# Patient Record
Sex: FEMALE | Race: WHITE | NOT HISPANIC OR LATINO | Employment: PART TIME | URBAN - METROPOLITAN AREA
[De-identification: names, ages, dates, MRNs, and addresses within clinical notes are randomized per-mention and may not be internally consistent; named-entity substitution may affect disease eponyms.]

---

## 2018-12-02 ENCOUNTER — HOSPITAL ENCOUNTER (EMERGENCY)
Facility: HOSPITAL | Age: 48
Discharge: HOME | End: 2018-12-02
Payer: MEDICAID

## 2018-12-02 VITALS
TEMPERATURE: 98 F | SYSTOLIC BLOOD PRESSURE: 127 MMHG | DIASTOLIC BLOOD PRESSURE: 73 MMHG | HEART RATE: 77 BPM | RESPIRATION RATE: 20 BRPM | WEIGHT: 145 LBS | OXYGEN SATURATION: 97 % | HEIGHT: 64 IN | BODY MASS INDEX: 24.75 KG/M2

## 2018-12-02 DIAGNOSIS — R13.10 SWALLOWING DIFFICULTY: ICD-10-CM

## 2018-12-02 DIAGNOSIS — R09.A2 GLOBUS SENSATION: Primary | ICD-10-CM

## 2018-12-02 LAB
ALBUMIN SERPL BCP-MCNC: 4.1 G/DL
ALP SERPL-CCNC: 87 U/L
ALT SERPL W/O P-5'-P-CCNC: 18 U/L
ANION GAP SERPL CALC-SCNC: 9 MMOL/L
AST SERPL-CCNC: 18 U/L
B-HCG UR QL: NEGATIVE
BASOPHILS # BLD AUTO: ABNORMAL K/UL
BASOPHILS NFR BLD: 0 %
BILIRUB SERPL-MCNC: 0.2 MG/DL
BUN SERPL-MCNC: 11 MG/DL
CALCIUM SERPL-MCNC: 9.6 MG/DL
CHLORIDE SERPL-SCNC: 107 MMOL/L
CO2 SERPL-SCNC: 25 MMOL/L
CREAT SERPL-MCNC: 0.8 MG/DL
CTP QC/QA: YES
DIFFERENTIAL METHOD: ABNORMAL
EOSINOPHIL # BLD AUTO: ABNORMAL K/UL
EOSINOPHIL NFR BLD: 1 %
ERYTHROCYTE [DISTWIDTH] IN BLOOD BY AUTOMATED COUNT: 14.8 %
EST. GFR  (AFRICAN AMERICAN): >60 ML/MIN/1.73 M^2
EST. GFR  (NON AFRICAN AMERICAN): >60 ML/MIN/1.73 M^2
GLUCOSE SERPL-MCNC: 124 MG/DL
HCT VFR BLD AUTO: 43.3 %
HGB BLD-MCNC: 13.9 G/DL
LYMPHOCYTES # BLD AUTO: ABNORMAL K/UL
LYMPHOCYTES NFR BLD: 31 %
MCH RBC QN AUTO: 27.9 PG
MCHC RBC AUTO-ENTMCNC: 32.1 G/DL
MCV RBC AUTO: 87 FL
MONOCYTES # BLD AUTO: ABNORMAL K/UL
MONOCYTES NFR BLD: 9 %
NEUTROPHILS NFR BLD: 57 %
NEUTS BAND NFR BLD MANUAL: 2 %
PLATELET # BLD AUTO: 249 K/UL
PLATELET BLD QL SMEAR: ABNORMAL
PMV BLD AUTO: 12.8 FL
POTASSIUM SERPL-SCNC: 4 MMOL/L
PROT SERPL-MCNC: 7.9 G/DL
RBC # BLD AUTO: 4.98 M/UL
SODIUM SERPL-SCNC: 141 MMOL/L
TSH SERPL DL<=0.005 MIU/L-ACNC: 3.73 UIU/ML
WBC # BLD AUTO: 10.56 K/UL

## 2018-12-02 PROCEDURE — 81025 URINE PREGNANCY TEST: CPT

## 2018-12-02 PROCEDURE — 84443 ASSAY THYROID STIM HORMONE: CPT

## 2018-12-02 PROCEDURE — 80053 COMPREHEN METABOLIC PANEL: CPT

## 2018-12-02 PROCEDURE — 99285 EMERGENCY DEPT VISIT HI MDM: CPT | Mod: 25

## 2018-12-02 PROCEDURE — 85007 BL SMEAR W/DIFF WBC COUNT: CPT

## 2018-12-02 PROCEDURE — 85027 COMPLETE CBC AUTOMATED: CPT

## 2018-12-02 PROCEDURE — 93005 ELECTROCARDIOGRAM TRACING: CPT

## 2018-12-02 PROCEDURE — 25000003 PHARM REV CODE 250

## 2018-12-02 RX ORDER — PANTOPRAZOLE SODIUM 20 MG/1
20 TABLET, DELAYED RELEASE ORAL DAILY
Qty: 10 TABLET | Refills: 0 | Status: SHIPPED | OUTPATIENT
Start: 2018-12-02 | End: 2018-12-12

## 2018-12-02 RX ORDER — PANTOPRAZOLE SODIUM 40 MG/1
40 TABLET, DELAYED RELEASE ORAL
Status: COMPLETED | OUTPATIENT
Start: 2018-12-02 | End: 2018-12-02

## 2018-12-02 RX ORDER — HYDROXYZINE PAMOATE 25 MG/1
25 CAPSULE ORAL
Status: COMPLETED | OUTPATIENT
Start: 2018-12-02 | End: 2018-12-02

## 2018-12-02 RX ADMIN — HYDROXYZINE PAMOATE 25 MG: 25 CAPSULE ORAL at 03:12

## 2018-12-02 RX ADMIN — PANTOPRAZOLE SODIUM 40 MG: 40 TABLET, DELAYED RELEASE ORAL at 04:12

## 2018-12-02 NOTE — ED NOTES
Patient identifiers for Amarilys Aguilar checked and correct.  LOC: The patient is awake, alert and aware of environment, the patient is oriented x 3 and speaking appropriately.  APPEARANCE: Very anxious. Patient uncomfortable and in no acute distress, patient is clean and well groomed, patient's clothing are properly fastened.  SKIN: The skin is warm and dry, patient has normal skin turgor and moist mucus membranes, skin intact, no breakdown or brusing noted.  MUSKULOSKELETAL: Patient moving all extremities well, no obvious swelling or deformities noted.  RESPIRATORY: Airway is open and patent, respirations are spontaneous, patient has a normal effort and rate.

## 2018-12-02 NOTE — ED PROVIDER NOTES
Encounter Date: 12/2/2018    SCRIBE #1 NOTE: I, Davina Pretty, am scribing for, and in the presence of,  Dr. Acosta. I have scribed the entire note.       History     Chief Complaint   Patient presents with    Dysphagia     1 hr onset dysphagia. Denies pain - states that she feels like she can't swallow. Presents in no distress.     A 48 year-old female, PMHx Hashimoto's Graves disease and recent pre-diabetes,  presents to the ED complaining of intermittent difficulty swallowing that started an hour ago while she was laying down in bed.  reports that shortly after onset she began to feel anxious and panicked. Patient states that she feels like their is something inhibiting ability to swallow.  She denies any difficulty breathing, sore throat, voice change, throat itch, or any other concerning symptoms. Patient reports abnormal thyroid study with outpatient workup for Hashimoto's Graves disease. No other palliative or provocative factors except as noted.       The history is provided by the patient.     Review of patient's allergies indicates:  No Known Allergies  Past Medical History:   Diagnosis Date    GERD (gastroesophageal reflux disease)     Thyroid disease      History reviewed. No pertinent surgical history.  History reviewed. No pertinent family history.  Social History     Tobacco Use    Smoking status: Never Smoker   Substance Use Topics    Alcohol use: Yes     Comment: occ    Drug use: No     Review of Systems   All other systems reviewed and are negative.      Physical Exam     Initial Vitals [12/02/18 0208]   BP Pulse Resp Temp SpO2   (!) 183/99 (!) 113 16 97.6 °F (36.4 °C) 96 %      MAP       --         Physical Exam    Nursing note and vitals reviewed.  Constitutional: She appears well-developed.   HENT:   Head: Normocephalic and atraumatic.   No cobblestoning   No oropharyngeal swelling    Eyes: EOM are normal.   Neck: Neck supple.   Mild thyromegaly   No lymphadenopathy    Cardiovascular: Normal rate and regular rhythm.   Pulmonary/Chest: Breath sounds normal. No respiratory distress.   Abdominal: Soft. There is no tenderness.   Musculoskeletal: Normal range of motion.   No acute deformity   Neurological: She is alert and oriented to person, place, and time.   Skin: Skin is warm and dry.   Psychiatric: She has a normal mood and affect.         ED Course   Procedures  Labs Reviewed   CBC W/ AUTO DIFFERENTIAL - Abnormal; Notable for the following components:       Result Value    RDW 14.8 (*)     All other components within normal limits   COMPREHENSIVE METABOLIC PANEL - Abnormal; Notable for the following components:    Glucose 124 (*)     All other components within normal limits   TSH   POCT URINE PREGNANCY     EKG Readings: (Independently Interpreted)   Interpretation per ED physician. Sinus rhythm without ectopy. No emergently concerning ST or T wave changes. No STEMI.  Rate 96. QTc is 452. Normal EKG     X-Rays:   Independently Interpreted Readings:   Other Readings:  Reviewed by myself, read by radiology.      Imaging Results          X-Ray Neck Soft Tissue (In process)               xr neck soft tissue negative per [facility] interpretation.     Medical Decision Making:   Clinical Tests:   Lab Tests: Ordered and Reviewed  Radiological Study: Ordered and Reviewed  Medical Tests: Reviewed and Ordered  ED Management:  4:59 AM   Patient re-evaluated, with reported improvement after medication. Physical exam benign after observation in the ED. X-ray negative, per radiology interpretation.     Patient is nontoxic appearing in the ED.  No persistent emergent issues detected.  Exam benign.  We will discharge home to follow up with GI. Patient will return to the ED as needed for any deterioration or any other concerns.  Verbal discharge instructions and return precautions given.                       Clinical Impression:     1. Globus sensation    2. Swallowing difficulty               I,  José Acosta, personally performed the services described in this documentation. All medical record entries made by the scribe were at my direction and in my presence.  I have reviewed the chart and agree that the record reflects my personal performance and is accurate and complete within the limitations of emergency medical charting. José Acosta M.D.           José Acosta MD  12/02/18 9757

## 2023-03-15 ENCOUNTER — HOSPITAL ENCOUNTER (EMERGENCY)
Facility: HOSPITAL | Age: 53
Discharge: HOME | End: 2023-03-16
Attending: STUDENT IN AN ORGANIZED HEALTH CARE EDUCATION/TRAINING PROGRAM
Payer: MEDICAID

## 2023-03-15 DIAGNOSIS — R53.83 FATIGUE, UNSPECIFIED TYPE: ICD-10-CM

## 2023-03-15 DIAGNOSIS — R51.9 FRONTAL HEADACHE: Primary | ICD-10-CM

## 2023-03-15 DIAGNOSIS — R30.0 DYSURIA: ICD-10-CM

## 2023-03-15 LAB
ALBUMIN SERPL BCP-MCNC: 4.4 G/DL (ref 3.5–5.2)
ALP SERPL-CCNC: 87 U/L (ref 55–135)
ALT SERPL W/O P-5'-P-CCNC: 20 U/L (ref 10–44)
ANION GAP SERPL CALC-SCNC: 13 MMOL/L (ref 8–16)
AST SERPL-CCNC: 17 U/L (ref 10–40)
BASOPHILS # BLD AUTO: 0.04 K/UL (ref 0–0.2)
BASOPHILS NFR BLD: 0.3 % (ref 0–1.9)
BILIRUB SERPL-MCNC: 0.4 MG/DL (ref 0.1–1)
BILIRUB UR QL STRIP: NEGATIVE
BUN SERPL-MCNC: 12 MG/DL (ref 6–20)
CALCIUM SERPL-MCNC: 10.3 MG/DL (ref 8.7–10.5)
CHLORIDE SERPL-SCNC: 104 MMOL/L (ref 95–110)
CLARITY UR: CLEAR
CO2 SERPL-SCNC: 23 MMOL/L (ref 23–29)
COLOR UR: COLORLESS
CREAT SERPL-MCNC: 0.7 MG/DL (ref 0.5–1.4)
DIFFERENTIAL METHOD: ABNORMAL
EOSINOPHIL # BLD AUTO: 0.1 K/UL (ref 0–0.5)
EOSINOPHIL NFR BLD: 0.7 % (ref 0–8)
ERYTHROCYTE [DISTWIDTH] IN BLOOD BY AUTOMATED COUNT: 13.2 % (ref 11.5–14.5)
EST. GFR  (NO RACE VARIABLE): >60 ML/MIN/1.73 M^2
GLUCOSE SERPL-MCNC: 116 MG/DL (ref 70–110)
GLUCOSE UR QL STRIP: NEGATIVE
HCT VFR BLD AUTO: 41.9 % (ref 37–48.5)
HGB BLD-MCNC: 13.8 G/DL (ref 12–16)
HGB UR QL STRIP: NEGATIVE
IMM GRANULOCYTES # BLD AUTO: 0.05 K/UL (ref 0–0.04)
IMM GRANULOCYTES NFR BLD AUTO: 0.4 % (ref 0–0.5)
KETONES UR QL STRIP: ABNORMAL
LEUKOCYTE ESTERASE UR QL STRIP: NEGATIVE
LYMPHOCYTES # BLD AUTO: 1.5 K/UL (ref 1–4.8)
LYMPHOCYTES NFR BLD: 12.2 % (ref 18–48)
MAGNESIUM SERPL-MCNC: 1.9 MG/DL (ref 1.6–2.6)
MCH RBC QN AUTO: 29.3 PG (ref 27–31)
MCHC RBC AUTO-ENTMCNC: 32.9 G/DL (ref 32–36)
MCV RBC AUTO: 89 FL (ref 82–98)
MONOCYTES # BLD AUTO: 0.5 K/UL (ref 0.3–1)
MONOCYTES NFR BLD: 4.5 % (ref 4–15)
NEUTROPHILS # BLD AUTO: 9.8 K/UL (ref 1.8–7.7)
NEUTROPHILS NFR BLD: 81.9 % (ref 38–73)
NITRITE UR QL STRIP: NEGATIVE
NRBC BLD-RTO: 0 /100 WBC
PH UR STRIP: 6 [PH] (ref 5–8)
PLATELET # BLD AUTO: 215 K/UL (ref 150–450)
PMV BLD AUTO: 12.6 FL (ref 9.2–12.9)
POTASSIUM SERPL-SCNC: 4 MMOL/L (ref 3.5–5.1)
PROT SERPL-MCNC: 7.8 G/DL (ref 6–8.4)
PROT UR QL STRIP: NEGATIVE
RBC # BLD AUTO: 4.71 M/UL (ref 4–5.4)
SODIUM SERPL-SCNC: 140 MMOL/L (ref 136–145)
SP GR UR STRIP: 1 (ref 1–1.03)
T4 FREE SERPL-MCNC: 1.29 NG/DL (ref 0.71–1.51)
TSH SERPL DL<=0.005 MIU/L-ACNC: <0.01 UIU/ML (ref 0.4–4)
URN SPEC COLLECT METH UR: ABNORMAL
UROBILINOGEN UR STRIP-ACNC: NEGATIVE EU/DL
WBC # BLD AUTO: 11.92 K/UL (ref 3.9–12.7)

## 2023-03-15 PROCEDURE — 83735 ASSAY OF MAGNESIUM: CPT | Performed by: STUDENT IN AN ORGANIZED HEALTH CARE EDUCATION/TRAINING PROGRAM

## 2023-03-15 PROCEDURE — 99285 EMERGENCY DEPT VISIT HI MDM: CPT | Mod: 25

## 2023-03-15 PROCEDURE — 84443 ASSAY THYROID STIM HORMONE: CPT | Performed by: STUDENT IN AN ORGANIZED HEALTH CARE EDUCATION/TRAINING PROGRAM

## 2023-03-15 PROCEDURE — 25000003 PHARM REV CODE 250: Performed by: STUDENT IN AN ORGANIZED HEALTH CARE EDUCATION/TRAINING PROGRAM

## 2023-03-15 PROCEDURE — 80053 COMPREHEN METABOLIC PANEL: CPT | Performed by: PHYSICIAN ASSISTANT

## 2023-03-15 PROCEDURE — 25000003 PHARM REV CODE 250: Performed by: PHYSICIAN ASSISTANT

## 2023-03-15 PROCEDURE — 85025 COMPLETE CBC W/AUTO DIFF WBC: CPT | Performed by: PHYSICIAN ASSISTANT

## 2023-03-15 PROCEDURE — 84439 ASSAY OF FREE THYROXINE: CPT | Performed by: STUDENT IN AN ORGANIZED HEALTH CARE EDUCATION/TRAINING PROGRAM

## 2023-03-15 PROCEDURE — 81003 URINALYSIS AUTO W/O SCOPE: CPT | Performed by: STUDENT IN AN ORGANIZED HEALTH CARE EDUCATION/TRAINING PROGRAM

## 2023-03-15 PROCEDURE — 96361 HYDRATE IV INFUSION ADD-ON: CPT

## 2023-03-15 PROCEDURE — 96374 THER/PROPH/DIAG INJ IV PUSH: CPT

## 2023-03-15 PROCEDURE — 63600175 PHARM REV CODE 636 W HCPCS: Performed by: PHYSICIAN ASSISTANT

## 2023-03-15 RX ORDER — DIPHENHYDRAMINE HCL 25 MG
25 CAPSULE ORAL
Status: COMPLETED | OUTPATIENT
Start: 2023-03-15 | End: 2023-03-15

## 2023-03-15 RX ORDER — ACETAMINOPHEN 500 MG
1000 TABLET ORAL
Status: COMPLETED | OUTPATIENT
Start: 2023-03-15 | End: 2023-03-15

## 2023-03-15 RX ORDER — DIPHENHYDRAMINE HYDROCHLORIDE 50 MG/ML
25 INJECTION INTRAMUSCULAR; INTRAVENOUS
Status: DISCONTINUED | OUTPATIENT
Start: 2023-03-15 | End: 2023-03-15

## 2023-03-15 RX ORDER — PROCHLORPERAZINE EDISYLATE 5 MG/ML
10 INJECTION INTRAMUSCULAR; INTRAVENOUS
Status: COMPLETED | OUTPATIENT
Start: 2023-03-15 | End: 2023-03-15

## 2023-03-15 RX ADMIN — ACETAMINOPHEN 1000 MG: 500 TABLET ORAL at 09:03

## 2023-03-15 RX ADMIN — DIPHENHYDRAMINE HYDROCHLORIDE 25 MG: 25 CAPSULE ORAL at 09:03

## 2023-03-15 RX ADMIN — PROCHLORPERAZINE EDISYLATE 10 MG: 5 INJECTION INTRAMUSCULAR; INTRAVENOUS at 09:03

## 2023-03-15 RX ADMIN — SODIUM CHLORIDE 1000 ML: 0.9 INJECTION, SOLUTION INTRAVENOUS at 09:03

## 2023-03-16 VITALS
OXYGEN SATURATION: 98 % | RESPIRATION RATE: 18 BRPM | TEMPERATURE: 98 F | SYSTOLIC BLOOD PRESSURE: 125 MMHG | HEART RATE: 71 BPM | DIASTOLIC BLOOD PRESSURE: 58 MMHG | BODY MASS INDEX: 23.18 KG/M2 | WEIGHT: 135.06 LBS

## 2023-03-16 RX ORDER — BUTALBITAL, ACETAMINOPHEN AND CAFFEINE 50; 325; 40 MG/1; MG/1; MG/1
1 TABLET ORAL EVERY 4 HOURS PRN
Qty: 30 TABLET | Refills: 0 | Status: SHIPPED | OUTPATIENT
Start: 2023-03-16 | End: 2023-04-15

## 2023-03-16 NOTE — ED PROVIDER NOTES
Encounter Date: 3/15/2023       History     Chief Complaint   Patient presents with    Headache     Patient reports migraines that started 1 year ago. States she might gets one every 3 months. Reports N/V x 4 episodes with frontal lobe headache that started 2 hours ago. +photophobia. Has tried Excedrin with no relief. Patient crying from discomfort.  Patient reports hx of Hashtimoto's diease.      52-year-old female history of reflux disease, Hashimoto's thyroiditis (on levothyroxine) who presents with headache.  Patient reports current episode started approximately 4 hours ago and described as dull frontal and rated 10/10.  Patient reports having 4 episodes of emesis described as nonbloody nonbilious.  Endorsed when pain started having photophobia and phonophobia.  Reports taking Excedrin with minimal relief furthermore reports thinking she may have vomited the Excedrin she took.  She reports suffering from headaches approximately a year ago but have been intermittent and self-resolving.  She states for the past 3 months it has been progressively worsening.  She denies any changes in her thyroid medications but does endorse for the past week feeling generalized fatigue.  She reports having dysuria but denies hematuria.  Denies any fevers or chills.  Denies any abdominal pain.  Denies any chest pain or shortness of breath.  Denies any focal weakness.    The history is provided by the patient and the spouse.   Review of patient's allergies indicates:  No Known Allergies  Past Medical History:   Diagnosis Date    GERD (gastroesophageal reflux disease)     Thyroid disease      No past surgical history on file.  No family history on file.  Social History     Tobacco Use    Smoking status: Never   Substance Use Topics    Alcohol use: Yes     Comment: occ    Drug use: No     Review of Systems   Constitutional:  Negative for chills and fever.   HENT:  Negative for congestion and rhinorrhea.    Eyes:  Negative for pain.    Respiratory:  Negative for cough and shortness of breath.    Cardiovascular:  Negative for chest pain and leg swelling.   Gastrointestinal:  Positive for nausea and vomiting. Negative for abdominal pain.   Endocrine: Negative for polyuria.   Genitourinary:  Positive for dysuria. Negative for hematuria.   Musculoskeletal:  Negative for gait problem and neck pain.   Skin:  Negative for rash.   Allergic/Immunologic: Negative for immunocompromised state.   Neurological:  Positive for headaches. Negative for weakness.     Physical Exam     Initial Vitals [03/15/23 1949]   BP Pulse Resp Temp SpO2   (!) 143/64 100 18 97.6 °F (36.4 °C) 99 %      MAP       --         Physical Exam    Nursing note and vitals reviewed.  Constitutional: She appears well-developed and well-nourished. She is not diaphoretic. No distress.   HENT:   Head: Normocephalic and atraumatic.   Eyes: Conjunctivae and EOM are normal. Pupils are equal, round, and reactive to light.   Neck: No Brudzinski's sign and no Kernig's sign noted.   Normal range of motion.  Cardiovascular:  Regular rhythm.           Pulmonary/Chest: Breath sounds normal. No respiratory distress.   Abdominal: Abdomen is soft. Bowel sounds are normal. She exhibits no distension. There is no abdominal tenderness. There is no rebound and no guarding.   Musculoskeletal:         General: No tenderness. Normal range of motion.      Cervical back: Normal range of motion.     Lymphadenopathy:     She has no cervical adenopathy.   Neurological: She is alert and oriented to person, place, and time.   Moves all extremities and carries on conversation. CN- II: PERRL; III/IV/VI: EOMI w/out evidence of nystagmus; V: no deficits appreciated to light touch bilateral face; VII: no facial weakness, no facial asymmetry. Eyebrow raise symmetric. Smile symmetric; IX/X: palate midline, and raises symmetrically; XI: shoulder shrug 5/5 bilaterally; XII: tongue is midline w/out asymmetry. Strength 5/5 to  bilateral upper and lower extremities, sensation intact to light touch,       Skin: Skin is warm. Capillary refill takes less than 2 seconds.   Psychiatric: She has a normal mood and affect. Her behavior is normal.       ED Course   Procedures  Labs Reviewed   CBC W/ AUTO DIFFERENTIAL - Abnormal; Notable for the following components:       Result Value    Gran # (ANC) 9.8 (*)     Immature Grans (Abs) 0.05 (*)     Gran % 81.9 (*)     Lymph % 12.2 (*)     All other components within normal limits   COMPREHENSIVE METABOLIC PANEL - Abnormal; Notable for the following components:    Glucose 116 (*)     All other components within normal limits   TSH - Abnormal; Notable for the following components:    TSH <0.010 (*)     All other components within normal limits   URINALYSIS, REFLEX TO URINE CULTURE - Abnormal; Notable for the following components:    Color, UA Colorless (*)     Ketones, UA 1+ (*)     All other components within normal limits    Narrative:     Specimen Source->Urine   MAGNESIUM   T4, FREE          Imaging Results              CT Head Without Contrast (Final result)  Result time 03/15/23 22:59:25      Final result by Christopher Diaz MD (03/15/23 22:59:25)                   Impression:      No acute abnormality.      Electronically signed by: Christopher iDaz  Date:    03/15/2023  Time:    22:59               Narrative:    EXAMINATION:  CT HEAD WITHOUT CONTRAST    CLINICAL HISTORY:  Headache, new or worsening (Age >= 50y);    TECHNIQUE:  Low dose axial CT images obtained throughout the head without intravenous contrast. Sagittal and coronal reconstructions were performed.    COMPARISON:  None.    FINDINGS:  Intracranial compartment:    Ventricles and sulci are normal in size for age without evidence of hydrocephalus. No extra-axial blood or fluid collections.    The brain parenchyma appears normal. No parenchymal mass, hemorrhage, edema or major vascular distribution infarct.    Skull/extracranial  contents (limited evaluation): No fracture. Small left maxillary sinus polyp or mucous retention cyst.  Mastoid air cells and paranasal sinuses are otherwise essentially clear.                                       Medications   prochlorperazine injection Soln 10 mg (10 mg Intravenous Given 3/15/23 2135)   sodium chloride 0.9% bolus 1,000 mL 1,000 mL (0 mLs Intravenous Stopped 3/15/23 2354)   diphenhydrAMINE capsule 25 mg (25 mg Oral Given 3/15/23 2139)   acetaminophen tablet 1,000 mg (1,000 mg Oral Given 3/15/23 2139)     Medical Decision Making:   History:   Old Medical Records: I decided to obtain old medical records.  Initial Assessment:   52-year-old female history of reflux disease, Hashimoto's thyroiditis (on levothyroxine) who presents with headache.  Patient in no acute distress, no focal neurological deficits on exam.  Vitals notable for mild tachycardia and elevated blood pressure otherwise unremarkable.  Patient without any meningeal signs on exam.  Suspect patient's headache secondary to migraines given history.  Given patient's report of generalized fatigue for the past week will obtain thyroid studies.  Given reported dysuria will obtain UA to assess for urinary tract infection, no suprapubic tenderness on exam.  Currently no systemic signs of infection based on physical exam and vitals. Differential diagnosis includes migraine versus tension type headache. Presentation not consistent with acute intracranial bleed to include SAH (lack of risk factors, headache history). Presentation not consistent with acute CNS infection to include meningitis or brain abscess, Temporal arteritis unlikely, as is acute angle closure glaucoma given history and physical findings.  Give given no prior head imaging and patient is reporting of 10/10 headache (breast headache of her life) will obtain CT head.    Plan: pain medication, CT brain, serial reassessment      Clinical Tests:   Lab Tests: Ordered and  Reviewed  Radiological Study: Ordered and Reviewed           ED Course as of 03/16/23 0044   Wed Mar 15, 2023   2212 CBC without significant leukocytosis, anemia, or platelet abnormalities.   [AS]   2353 Patient reports improvement of symptoms after migraine cocktail.  UA without signs of infection, CT head negative. [AS]   2359 Free T4: 1.29 [AS]   Thu Mar 16, 2023   0011 Patient reports complete resolution of her headache.  Suspect headache secondary to migraine.  Free T4 level within normal limits.  Referral for Neurology place given worsening headaches these past 3 months.  Pt is currently stable for discharge. I see no indication of an emergent process beyond that addressed during our encounter but have duly counseled the patient/family regarding the need for prompt follow-up as well as the indications that should prompt immediate return to the emergency room should new or worrisome developments occur. The patient/family has been provided with verbal and printed direction regarding our final diagnosis(es) as well as instructions regarding use of OTC and/or Rx medications intended to manage the patient's aforementioned conditions. The patient/family verbalized an understanding. The patient/family is asked if there are any questions or concerns. We discuss the case, until all issues are addressed to the patient/family's satisfaction. Patient/family understands and is agreeable to the plan.   [AS]      ED Course User Index  [AS] George Quinteros MD          This dictation has been generated using M-Modal Fluency Direct dictation; some phonetic errors may occur.         Clinical Impression:   Final diagnoses:  [R51.9] Frontal headache (Primary)  [R53.83] Fatigue, unspecified type  [R30.0] Dysuria        ED Disposition Condition    Discharge Stable          ED Prescriptions       Medication Sig Dispense Start Date End Date Auth. Provider    butalbital-acetaminophen-caffeine -40 mg (FIORICET, ESGIC) -40 mg  per tablet Take 1 tablet by mouth every 4 (four) hours as needed for Pain. 30 tablet 3/16/2023 4/15/2023 George Quinteros MD          Follow-up Information       Follow up With Specialties Details Why Contact Info    [place] - Emergency Dept Emergency Medicine  If symptoms worsen [address] [facility] [address] [phone]    Primary care doctor  Call in 1 day for reassesment     Neurology   for reassesment              George Quinteros MD  03/16/23 0044

## 2023-03-16 NOTE — DISCHARGE INSTRUCTIONS
Thank you for coming to our Emergency Department today. It is important to remember that some problems are difficult to diagnose and may not be found during your first visit. Be sure to follow up with your primary care doctor and review any labs/imaging that was performed with them. If you do not have a primary care doctor, you may contact the one listed on your discharge paperwork or you may also call the [facility] Appointment Desk at [phone] to schedule an appointment with one.     All medications may potentially have side effects and it is impossible to predict which medications may give you side effects. If you feel that you are having a negative effect of any medication you should immediately stop taking them and seek medical attention.    Return to the ER with any questions/concerns, new/concerning symptoms, worsening or failure to improve. Do not drive or make any important decisions for 24 hours if you have received any pain medications, sedatives or mood altering drugs during your ER visit.

## 2023-03-16 NOTE — FIRST PROVIDER EVALUATION
Emergency Department TeleTriage Encounter Note      CHIEF COMPLAINT    Chief Complaint   Patient presents with    Headache     Patient reports migraines that started 1 year ago. States she might gets one every 3 months. Reports N/V x 4 episodes with frontal lobe headache that started 2 hours ago. +photophobia. Has tried Excedrin with no relief. Patient crying from discomfort.  Patient reports hx of Hashtimoto's diease.        VITAL SIGNS   Initial Vitals [03/15/23 1949]   BP Pulse Resp Temp SpO2   (!) 143/64 100 18 97.6 °F (36.4 °C) 99 %      MAP       --            ALLERGIES    Review of patient's allergies indicates:  No Known Allergies    PROVIDER TRIAGE NOTE  This is a teletriage evaluation of a 52 y.o. female presenting to the ED complaining of headache.  Reports she started with headaches about a year ago, but she has never seen a doctor for this.  Reports she is typically able to control pain with OTC meds.  Reports today the pain is different - started suddently.  Crying in pain and actively vomiting.  Will hold off on toradol until head ct results - will give other meds to help pain.     Initial orders will be placed and care will be transferred to an alternate provider when patient is roomed for a full evaluation. Any additional orders and the final disposition will be determined by that provider.         ORDERS  Labs Reviewed   CBC W/ AUTO DIFFERENTIAL   COMPREHENSIVE METABOLIC PANEL       ED Orders (720h ago, onward)      Start Ordered     Status Ordering Provider    03/15/23 2015 03/15/23 2001  prochlorperazine injection Soln 10 mg  ED 1 Time         Ordered JAQUELIN SULLIVAN    03/15/23 2015 03/15/23 2001  diphenhydrAMINE injection 25 mg  ED 1 Time         Ordered JAQUELIN SULLIVAN    03/15/23 2015 03/15/23 2001  sodium chloride 0.9% bolus 1,000 mL 1,000 mL  ED 1 Time         Ordered JAQUELIN SULLIVAN    03/15/23 2001 03/15/23 2001  Saline lock IV  Once         Ordered  JAQUELIN SULLIVAN    03/15/23 2001 03/15/23 2001  CBC auto differential  STAT         Ordered JAQUELIN SULLIVAN    03/15/23 2001 03/15/23 2001  Comprehensive metabolic panel  STAT         Ordered JAQUELIN SULLIVAN.    03/15/23 2001 03/15/23 2001  CT Head Without Contrast  1 time imaging         Ordered JAQUELIN SULLIVAN              Virtual Visit Note: The provider triage portion of this emergency department evaluation and documentation was performed via [employer], a HIPAA-compliant telemedicine application, in concert with a tele-presenter in the room. A face to face patient evaluation with one of my colleagues will occur once the patient is placed in an emergency department room.      DISCLAIMER: This note was prepared with [employer] voice recognition transcription software. Garbled syntax, mangled pronouns, and other bizarre constructions may be attributed to that software system.

## 2023-03-16 NOTE — ED NOTES
Pt c/o frontal headache and N/V. Pt endorses sensitivity to light. Pt has Hx migraines. +A/O x 4 w/ ABCs intact, NAD. VSS. No neuro deficits.     Review of patient's allergies indicates:  No Known Allergies     Patient has verified the spelling of their name and  on armband.   APPEARANCE: Patient is alert, calm, oriented x 4, and does not appear distressed.  SKIN: Skin is normal for race, warm, and dry. Normal skin turgor and mucous membranes moist.  CARDIAC: Normal rate and rhythm, no murmur heard.   RESPIRATORY:Normal rate and effort. Breath sounds clear bilaterally throughout chest. Respirations are equal and unlabored.    GASTRO: Bowel sounds normal, abdomen is soft, no tenderness, and no abdominal distention.  MUSCLE: Full ROM. No bony tenderness or soft tissue tenderness. No obvious deformity.  PERIPHERAL VASCULAR: peripheral pulses present. Normal cap refill. No edema. Warm to touch.  NEURO: 5/5 strength major flexors/extensors bilaterally. Sensory intact to light touch bilaterally. Mishel coma scale: eyes open spontaneously-4, oriented & converses-5, obeys commands-6. No neurological abnormalities.   MENTAL STATUS: awake, alert and aware of environment.  : Voids without complication    ED orders in progress. Pt SR up x 2. Bed in lowest position with wheels locked. Call bell within reach of pt.

## 2023-05-01 ENCOUNTER — TELEPHONE (OUTPATIENT)
Dept: EMERGENCY MEDICINE | Facility: HOSPITAL | Age: 53
End: 2023-05-01
Payer: MEDICAID

## 2023-06-01 ENCOUNTER — HOSPITAL ENCOUNTER (EMERGENCY)
Facility: HOSPITAL | Age: 53
Discharge: HOME | End: 2023-06-01
Attending: EMERGENCY MEDICINE
Payer: MEDICAID

## 2023-06-01 VITALS
SYSTOLIC BLOOD PRESSURE: 119 MMHG | RESPIRATION RATE: 20 BRPM | HEART RATE: 92 BPM | TEMPERATURE: 99 F | OXYGEN SATURATION: 98 % | BODY MASS INDEX: 22.48 KG/M2 | WEIGHT: 130.94 LBS | DIASTOLIC BLOOD PRESSURE: 59 MMHG

## 2023-06-01 DIAGNOSIS — R11.2 NAUSEA, VOMITING AND DIARRHEA: Primary | ICD-10-CM

## 2023-06-01 DIAGNOSIS — R19.7 NAUSEA, VOMITING AND DIARRHEA: Primary | ICD-10-CM

## 2023-06-01 LAB
ALBUMIN SERPL BCP-MCNC: 4.4 G/DL (ref 3.5–5.2)
ALP SERPL-CCNC: 101 U/L (ref 55–135)
ALT SERPL W/O P-5'-P-CCNC: 22 U/L (ref 10–44)
ANION GAP SERPL CALC-SCNC: 11 MMOL/L (ref 8–16)
AST SERPL-CCNC: 17 U/L (ref 10–40)
BASOPHILS # BLD AUTO: 0.05 K/UL (ref 0–0.2)
BASOPHILS NFR BLD: 0.3 % (ref 0–1.9)
BILIRUB SERPL-MCNC: 0.4 MG/DL (ref 0.1–1)
BUN SERPL-MCNC: 12 MG/DL (ref 6–20)
CALCIUM SERPL-MCNC: 10 MG/DL (ref 8.7–10.5)
CHLORIDE SERPL-SCNC: 107 MMOL/L (ref 95–110)
CO2 SERPL-SCNC: 24 MMOL/L (ref 23–29)
CREAT SERPL-MCNC: 0.8 MG/DL (ref 0.5–1.4)
DIFFERENTIAL METHOD: ABNORMAL
EOSINOPHIL # BLD AUTO: 0.2 K/UL (ref 0–0.5)
EOSINOPHIL NFR BLD: 1 % (ref 0–8)
ERYTHROCYTE [DISTWIDTH] IN BLOOD BY AUTOMATED COUNT: 14 % (ref 11.5–14.5)
EST. GFR  (NO RACE VARIABLE): >60 ML/MIN/1.73 M^2
GLUCOSE SERPL-MCNC: 180 MG/DL (ref 70–110)
HCT VFR BLD AUTO: 44.8 % (ref 37–48.5)
HGB BLD-MCNC: 14.5 G/DL (ref 12–16)
IMM GRANULOCYTES # BLD AUTO: 0.06 K/UL (ref 0–0.04)
IMM GRANULOCYTES NFR BLD AUTO: 0.4 % (ref 0–0.5)
LIPASE SERPL-CCNC: 18 U/L (ref 4–60)
LYMPHOCYTES # BLD AUTO: 1.6 K/UL (ref 1–4.8)
LYMPHOCYTES NFR BLD: 10.6 % (ref 18–48)
MAGNESIUM SERPL-MCNC: 2 MG/DL (ref 1.6–2.6)
MCH RBC QN AUTO: 28.6 PG (ref 27–31)
MCHC RBC AUTO-ENTMCNC: 32.4 G/DL (ref 32–36)
MCV RBC AUTO: 88 FL (ref 82–98)
MONOCYTES # BLD AUTO: 0.8 K/UL (ref 0.3–1)
MONOCYTES NFR BLD: 5.3 % (ref 4–15)
NEUTROPHILS # BLD AUTO: 12.8 K/UL (ref 1.8–7.7)
NEUTROPHILS NFR BLD: 82.4 % (ref 38–73)
NRBC BLD-RTO: 0 /100 WBC
PLATELET # BLD AUTO: 242 K/UL (ref 150–450)
PMV BLD AUTO: 12.3 FL (ref 9.2–12.9)
POTASSIUM SERPL-SCNC: 4 MMOL/L (ref 3.5–5.1)
PROT SERPL-MCNC: 8.1 G/DL (ref 6–8.4)
RBC # BLD AUTO: 5.07 M/UL (ref 4–5.4)
SODIUM SERPL-SCNC: 142 MMOL/L (ref 136–145)
WBC # BLD AUTO: 15.49 K/UL (ref 3.9–12.7)

## 2023-06-01 PROCEDURE — 99284 EMERGENCY DEPT VISIT MOD MDM: CPT | Mod: 25

## 2023-06-01 PROCEDURE — 96361 HYDRATE IV INFUSION ADD-ON: CPT

## 2023-06-01 PROCEDURE — 25000003 PHARM REV CODE 250: Performed by: EMERGENCY MEDICINE

## 2023-06-01 PROCEDURE — 80053 COMPREHEN METABOLIC PANEL: CPT | Performed by: EMERGENCY MEDICINE

## 2023-06-01 PROCEDURE — 96372 THER/PROPH/DIAG INJ SC/IM: CPT | Performed by: EMERGENCY MEDICINE

## 2023-06-01 PROCEDURE — 96374 THER/PROPH/DIAG INJ IV PUSH: CPT

## 2023-06-01 PROCEDURE — 63600175 PHARM REV CODE 636 W HCPCS: Performed by: EMERGENCY MEDICINE

## 2023-06-01 PROCEDURE — 83690 ASSAY OF LIPASE: CPT | Performed by: EMERGENCY MEDICINE

## 2023-06-01 PROCEDURE — 96375 TX/PRO/DX INJ NEW DRUG ADDON: CPT

## 2023-06-01 PROCEDURE — 85025 COMPLETE CBC W/AUTO DIFF WBC: CPT | Performed by: EMERGENCY MEDICINE

## 2023-06-01 PROCEDURE — 83735 ASSAY OF MAGNESIUM: CPT | Performed by: EMERGENCY MEDICINE

## 2023-06-01 RX ORDER — MORPHINE SULFATE 4 MG/ML
3 INJECTION, SOLUTION INTRAMUSCULAR; INTRAVENOUS
Status: COMPLETED | OUTPATIENT
Start: 2023-06-01 | End: 2023-06-01

## 2023-06-01 RX ORDER — ONDANSETRON 2 MG/ML
8 INJECTION INTRAMUSCULAR; INTRAVENOUS
Status: COMPLETED | OUTPATIENT
Start: 2023-06-01 | End: 2023-06-01

## 2023-06-01 RX ORDER — BUTALBITAL, ACETAMINOPHEN AND CAFFEINE 50; 325; 40 MG/1; MG/1; MG/1
2 TABLET ORAL EVERY 6 HOURS PRN
Qty: 20 TABLET | Refills: 1 | Status: SHIPPED | OUTPATIENT
Start: 2023-06-01 | End: 2023-07-01

## 2023-06-01 RX ORDER — ONDANSETRON 4 MG/1
4 TABLET, ORALLY DISINTEGRATING ORAL EVERY 6 HOURS PRN
Qty: 15 TABLET | Refills: 0 | Status: SHIPPED | OUTPATIENT
Start: 2023-06-01

## 2023-06-01 RX ORDER — DICYCLOMINE HYDROCHLORIDE 10 MG/ML
20 INJECTION INTRAMUSCULAR
Status: COMPLETED | OUTPATIENT
Start: 2023-06-01 | End: 2023-06-01

## 2023-06-01 RX ORDER — DICYCLOMINE HYDROCHLORIDE 20 MG/1
20 TABLET ORAL 3 TIMES DAILY PRN
Qty: 15 TABLET | Refills: 0 | Status: SHIPPED | OUTPATIENT
Start: 2023-06-01

## 2023-06-01 RX ADMIN — DICYCLOMINE HYDROCHLORIDE 20 MG: 20 INJECTION, SOLUTION INTRAMUSCULAR at 06:06

## 2023-06-01 RX ADMIN — ONDANSETRON 8 MG: 2 INJECTION INTRAMUSCULAR; INTRAVENOUS at 06:06

## 2023-06-01 RX ADMIN — MORPHINE SULFATE 3 MG: 4 INJECTION INTRAVENOUS at 06:06

## 2023-06-01 RX ADMIN — SODIUM CHLORIDE 2000 ML: 0.9 INJECTION, SOLUTION INTRAVENOUS at 06:06

## 2023-06-01 NOTE — ED NOTES
Pt c/o diffuse ABD pain and N/V/D x 4 hours. Pt reports pain as cramping. No other complaints.    Review of patient's allergies indicates:  No Known Allergies     Patient has verified the spelling of their name and  on armband.   APPEARANCE: Patient is alert, calm, oriented x 4, and does not appear distressed.  SKIN: Skin is normal for race, warm, and dry. Normal skin turgor and mucous membranes moist.  CARDIAC: Normal rate and rhythm, no murmur heard.   RESPIRATORY:Normal rate and effort. Breath sounds clear bilaterally throughout chest. Respirations are equal and unlabored.    GASTRO: Bowel sounds normal, abdomen is soft, +diffuse tenderness/ cramping, and no abdominal distention.  MUSCLE: Full ROM. No bony tenderness or soft tissue tenderness. No obvious deformity.  PERIPHERAL VASCULAR: peripheral pulses present. Normal cap refill. No edema. Warm to touch.  NEURO: 5/5 strength major flexors/extensors bilaterally. Sensory intact to light touch bilaterally. Mishel coma scale: eyes open spontaneously-4, oriented & converses-5, obeys commands-6. No neurological abnormalities.   MENTAL STATUS: awake, alert and aware of environment.  : Voids without complication    ED orders in progress. Pt SR up x 2. Bed in lowest position with wheels locked. Call bell within reach of pt.

## 2023-06-01 NOTE — Clinical Note
"Amarilys Aguilar (Karen) was seen and treated in our emergency department on 6/1/2023.  She may return to work on 06/03/2023.       If you have any questions or concerns, please don't hesitate to call.       RN    "

## 2023-06-01 NOTE — ED PROVIDER NOTES
Encounter Date: 6/1/2023       History     Chief Complaint   Patient presents with    Abdominal Pain     Patient states she ate seafood at restaurant around 7:30 pm last night. Started with N/V/D around 2 am.  states he ate same food with no symptoms. Patient denies fever.      Patient is a 52-year-old female who complains of nausea, vomiting and diarrhea.  This began abruptly about 4 hours ago.  She also has diffuse abdominal pain.  No fever.  Patient states she ate at a seafood restaurant at about 730 last night.   was with her at the restaurant but has no symptoms.    Review of patient's allergies indicates:  No Known Allergies  Past Medical History:   Diagnosis Date    GERD (gastroesophageal reflux disease)     Thyroid disease      History reviewed. No pertinent surgical history.  History reviewed. No pertinent family history.  Social History     Tobacco Use    Smoking status: Never   Substance Use Topics    Alcohol use: Yes     Comment: occ    Drug use: No     Review of Systems   Constitutional:  Negative for fever.   Gastrointestinal:  Positive for abdominal pain, diarrhea, nausea and vomiting.   Neurological:  Positive for weakness.     Physical Exam     Initial Vitals   BP Pulse Resp Temp SpO2   06/01/23 0551 06/01/23 0551 06/01/23 0551 06/01/23 0619 06/01/23 0551   130/75 (!) 114 19 97.9 °F (36.6 °C) 96 %      MAP       --                Physical Exam    Nursing note and vitals reviewed.  Constitutional: She appears distressed.   HENT:   Dry mucous membranes.   Eyes: EOM are normal.   Cardiovascular:            Tachycardic.   Pulmonary/Chest: Breath sounds normal.   Abdominal: Abdomen is soft. Bowel sounds are normal.   Diffuse tenderness without guarding or rebound.     Neurological: She is alert.   Skin: Skin is dry.   Psychiatric: Thought content normal.       ED Course   Procedures  Labs Reviewed   CBC W/ AUTO DIFFERENTIAL - Abnormal; Notable for the following components:       Result  Value    WBC 15.49 (*)     Gran # (ANC) 12.8 (*)     Immature Grans (Abs) 0.06 (*)     Gran % 82.4 (*)     Lymph % 10.6 (*)     All other components within normal limits   COMPREHENSIVE METABOLIC PANEL - Abnormal; Notable for the following components:    Glucose 180 (*)     All other components within normal limits   LIPASE   MAGNESIUM          Imaging Results    None          Medications   sodium chloride 0.9% bolus 2,000 mL 2,000 mL (0 mLs Intravenous Stopped 6/1/23 0840)   ondansetron injection 8 mg (8 mg Intravenous Given 6/1/23 0614)   dicyclomine injection 20 mg (20 mg Intramuscular Given 6/1/23 0617)   morphine injection 3 mg (3 mg Intravenous Given 6/1/23 0619)     Medical Decision Making:   Initial Assessment:   52-year-old female with vomiting and diarrhea over the past few hours.  Differential Diagnosis:   Viral gastroenteritis, food poisoning, small-bowel obstruction, pancreatitis, dehydration, electrolyte abnormality..  Clinical Tests:   Lab Tests: Ordered and Reviewed       <> Summary of Lab: CBC shows a white blood cell count of 15.49.  CMP with a glucose of 180.   ED Management:  Patient was given IV fluids, Zofran, Bentyl and morphine here in the emergency department.  Her symptoms were completely relieved.  Re-examination of her abdomen yields no tenderness.  Most likely etiology is viral gastroenteritis.  I feel the patient may be safely discharged to home and I will write her prescriptions for Bentyl and Zofran.  She should follow up with the primary physician but may return to the emergency department for any worsening of symptoms or any other urgent concerns.                        Clinical Impression:   Final diagnoses:  [R11.2, R19.7] Nausea, vomiting and diarrhea (Primary)        ED Disposition Condition    Discharge Stable          ED Prescriptions       Medication Sig Dispense Start Date End Date Auth. Provider    dicyclomine (BENTYL) 20 mg tablet Take 1 tablet (20 mg total) by mouth 3  (three) times daily as needed (Abdominal pain). 15 tablet 6/1/2023 -- Abhinav Leach MD    ondansetron (ZOFRAN-ODT) 4 MG TbDL Take 1 tablet (4 mg total) by mouth every 6 (six) hours as needed (Nausea or vomiting). 15 tablet 6/1/2023 -- Abhinav Leach MD    butalbital-acetaminophen-caffeine -40 mg (FIORICET, ESGIC) -40 mg per tablet Take 2 tablets by mouth every 6 (six) hours as needed for Headaches. 20 tablet 6/1/2023 7/1/2023 Abhinav Leach MD          Follow-up Information       Follow up With Specialties Details Why Contact Info    Your primary physician   As needed     [facility] Emergency Dept Emergency Medicine  If symptoms worsen [address] [address] [phone]             Abhinav Leach MD  06/01/23 2004

## 2023-06-01 NOTE — ED NOTES
Care hand off from BRADLEY Villegas RN. Dr. Leach at bedside for reassessment. Pt. Is feeling better since initiation of treatments. Tolerating sips of h2o at this time. Nausea is resolved presently. Minimal abdominal discomfort.

## 2023-06-07 ENCOUNTER — PATIENT OUTREACH (OUTPATIENT)
Dept: EMERGENCY MEDICINE | Facility: HOSPITAL | Age: 53
End: 2023-06-07
Payer: MEDICAID

## 2023-06-07 NOTE — PROGRESS NOTES
Magaly Whitman  ED Navigator  Emergency Department    [place] [facility] [place]  Role: Community Health Worker    Date: 06/07/2023  Patient Name: Amarilys Aguilar  MRN: [id number]  PCP: Lana Horowitz MD    Assessment:     Amarilys Aguilar is a 52 y.o. female who has presented to ED for abdominal pain. Patient has visited the ED 2 times in the past 3 months. Patient did not contact PCP.     ED Navigator Initial Assessment    ED Navigator Enrollment Documentation  Consent to Services  Does patient consent to completing the assessment?: Yes  Contact  Method of Initial Contact: Phone  Transportation  Does the patient have issues with Transportation?: No  Does the patient have transportation to and from healthcare appointments?: Yes  Insurance Coverage  Do you have coverage/adequate coverage?: Yes  Type/kind of coverage: [facility]  Is patient able to afford co-pays/deductibles?: Yes  Is patient able to afford HME or supplies?: Yes  Does patient have an established [facility] PCP?: Yes  Able to access?: Yes  Does the patient have a lack of adequate coverage?: No  Specialist Appointment  Did the patient come to the ED to see a specialist?: No  Does the patient have a pending specialist referral?: No  Does the patient have a specialist appointment made?: No  PCP Follow Up Appointment  Has the patient had an appointment with a primary care provider in the past year?: Yes  Approximate date: 6/6/23  Provider: Lana Horowitz MD  Does the patient have a follow up appontment with a PCP?: No  When was the last time you saw your PCP?: 6/6/23  Why does the patient not have a follow up scheduled?: Other (see comments) (Comment: 06/06 was her ED F/U appt from 06/01)  Medications  Is patient able to afford medication?: Yes  Is patient unable to get medication due to lack of transportation?: No  Psychological  Does the patient have psycho-social concerns?: No  Food  Does the patient have concerns about  food?: No  Communication/Education  Does the patient have limited English proficiency/English not primary language?: No  Does patient have low literacy and/or low health literacy?: Yes  Does patient have concerns with care?: No  Does patient have dissatisfaction with care?: No  Other Financial Concerns  Does the patient have immediate financial distress?: No  Does the patient have general financial concerns?: No  Other Social Barriers/Concerns  Does the patient have any additional barriers or concerns?: None  Primary Barrier  Barriers identified: Cognitive barrier (health literacy, language and communication, etc.)  Root Cause of ED Utilization: Patient Knowledge/Low Health Literacy  Plan to address Patient Knowledge/Low Health Literacy: Provided information for [facility] On [facility] 24/7 Nurse triage line [phone] or 1-866-[facility] ([phone])  Next steps: Provided Education  Was education/educational materials provided surrounding PCP services/creating a medical home?: Yes Was education verbal or written?: Written     Was education/educational materials provided surrounding low cost, healthy foods?: Yes Was education verbal or written?: Written     Was education/educational materials provided surrounding other items? If so, use comment to explain.: Yes Was education verbal or written?: Written   Plan: Provided information for [facility] 24/7 Nurse triage line, [phone] or 1-866-[facility] ([phone])  Expected Date of Follow Up 1: 7/4/23         Social History     Socioeconomic History    Marital status:    Tobacco Use    Smoking status: Never   Substance and Sexual Activity    Alcohol use: Yes     Comment: occ    Drug use: No     Social Determinants of Health     Financial Resource Strain: Low Risk     Difficulty of Paying Living Expenses: Not hard at all   Food Insecurity: No Food Insecurity    Worried About Running Out of Food in the Last Year: Never true    Ran Out of Food in the Last Year:  Never true   Transportation Needs: No Transportation Needs    Lack of Transportation (Medical): No    Lack of Transportation (Non-Medical): No   Stress: No Stress Concern Present    Feeling of Stress : Not at all   Social Connections: Unknown    Frequency of Communication with Friends and Family: Twice a week    Frequency of Social Gatherings with Friends and Family: Once a week    Marital Status:    Housing Stability: Low Risk     Unable to Pay for Housing in the Last Year: No    Number of Places Lived in the Last Year: 1    Unstable Housing in the Last Year: No       Plan:   Patient agreed with enrollment and F/U calls. Patient has already had PCP F/U after 6/1 ED visit. She needs no other assistance making appointments. Patient denies needing resources for transportation, food, housing, utilities, smoking cessation, and anxiety/stress/depression. Emailed resources to patient along with MCIP documents (Right Care Right Place form, [place] Virtual Visit Flyer, [facility] PCP scheduling assistance, [facility] on call RN #, and Heart Healthy Diet education). Will F/U with pt on 07/04/23

## 2023-07-14 ENCOUNTER — PATIENT OUTREACH (OUTPATIENT)
Dept: EMERGENCY MEDICINE | Facility: HOSPITAL | Age: 53
End: 2023-07-14
Payer: MEDICAID

## 2023-08-23 ENCOUNTER — PATIENT OUTREACH (OUTPATIENT)
Dept: EMERGENCY MEDICINE | Facility: HOSPITAL | Age: 53
End: 2023-08-23
Payer: MEDICAID

## 2023-11-02 ENCOUNTER — PATIENT OUTREACH (OUTPATIENT)
Dept: EMERGENCY MEDICINE | Facility: HOSPITAL | Age: 53
End: 2023-11-02
Payer: MEDICAID

## 2023-12-19 ENCOUNTER — PATIENT OUTREACH (OUTPATIENT)
Dept: EMERGENCY MEDICINE | Facility: HOSPITAL | Age: 53
End: 2023-12-19
Payer: MEDICAID

## 2023-12-19 NOTE — PROGRESS NOTES
Contacted patient today for F/U. Patient denies any new needs or needing any additional assistance at this time. Instructed patient to call with any future needs/concerns

## 2024-11-30 ENCOUNTER — HOSPITAL ENCOUNTER (OUTPATIENT)
Facility: HOSPITAL | Age: 54
Discharge: HOME | End: 2024-12-02
Attending: EMERGENCY MEDICINE | Admitting: INTERNAL MEDICINE
Payer: MEDICAID

## 2024-11-30 DIAGNOSIS — I24.9 ACS (ACUTE CORONARY SYNDROME): ICD-10-CM

## 2024-11-30 DIAGNOSIS — R07.9 CHEST PAIN: ICD-10-CM

## 2024-11-30 DIAGNOSIS — I21.4 NSTEMI (NON-ST ELEVATED MYOCARDIAL INFARCTION): Primary | ICD-10-CM

## 2024-11-30 DIAGNOSIS — R79.89 ELEVATED TROPONIN: ICD-10-CM

## 2024-11-30 DIAGNOSIS — R00.0 TACHYCARDIA: ICD-10-CM

## 2024-11-30 DIAGNOSIS — R10.9 ABDOMINAL PAIN, UNSPECIFIED ABDOMINAL LOCATION: ICD-10-CM

## 2024-11-30 DIAGNOSIS — F41.9 ANXIETY: ICD-10-CM

## 2024-11-30 PROCEDURE — 83690 ASSAY OF LIPASE: CPT | Performed by: EMERGENCY MEDICINE

## 2024-11-30 PROCEDURE — 84443 ASSAY THYROID STIM HORMONE: CPT | Performed by: EMERGENCY MEDICINE

## 2024-11-30 PROCEDURE — 84484 ASSAY OF TROPONIN QUANT: CPT | Performed by: EMERGENCY MEDICINE

## 2024-11-30 PROCEDURE — 80053 COMPREHEN METABOLIC PANEL: CPT | Performed by: EMERGENCY MEDICINE

## 2024-11-30 PROCEDURE — 85379 FIBRIN DEGRADATION QUANT: CPT | Performed by: EMERGENCY MEDICINE

## 2024-11-30 PROCEDURE — 83880 ASSAY OF NATRIURETIC PEPTIDE: CPT | Performed by: EMERGENCY MEDICINE

## 2024-11-30 PROCEDURE — 99285 EMERGENCY DEPT VISIT HI MDM: CPT

## 2024-11-30 PROCEDURE — 85025 COMPLETE CBC W/AUTO DIFF WBC: CPT | Performed by: EMERGENCY MEDICINE

## 2024-11-30 PROCEDURE — 84439 ASSAY OF FREE THYROXINE: CPT | Performed by: EMERGENCY MEDICINE

## 2024-11-30 RX ORDER — ASPIRIN 325 MG
325 TABLET ORAL
Status: COMPLETED | OUTPATIENT
Start: 2024-11-30 | End: 2024-12-01

## 2024-11-30 RX ORDER — NITROGLYCERIN 0.4 MG/1
0.4 TABLET SUBLINGUAL EVERY 5 MIN PRN
Status: DISCONTINUED | OUTPATIENT
Start: 2024-11-30 | End: 2024-12-02 | Stop reason: HOSPADM

## 2024-11-30 NOTE — Clinical Note
The catheter was inserted into the ostial  left coronary artery. An angiography was performed of the left coronary arteries. Multiple views were taken. The angiography was performed via hand injection with 40 mL of contrast.

## 2024-11-30 NOTE — Clinical Note
Attempted to call report to pre/post Cath Lab x2. Phone line rang busy each time. Will give a full bedside report.

## 2024-12-01 PROBLEM — I24.9 ACS (ACUTE CORONARY SYNDROME): Status: ACTIVE | Noted: 2024-12-01

## 2024-12-01 LAB
ABO + RH BLD: NORMAL
ALBUMIN SERPL BCP-MCNC: 4.5 G/DL (ref 3.5–5.2)
ALP SERPL-CCNC: 107 U/L (ref 40–150)
ALT SERPL W/O P-5'-P-CCNC: 21 U/L (ref 10–44)
ANION GAP SERPL CALC-SCNC: 12 MMOL/L (ref 8–16)
APTT PPP: 29.7 SEC (ref 21–32)
APTT PPP: 90.1 SEC (ref 21–32)
AST SERPL-CCNC: 20 U/L (ref 10–40)
BASOPHILS # BLD AUTO: 0.06 K/UL (ref 0–0.2)
BASOPHILS # BLD AUTO: 0.1 K/UL (ref 0–0.2)
BASOPHILS NFR BLD: 0.7 % (ref 0–1.9)
BASOPHILS NFR BLD: 0.9 % (ref 0–1.9)
BILIRUB SERPL-MCNC: 0.5 MG/DL (ref 0.1–1)
BLD GP AB SCN CELLS X3 SERPL QL: NORMAL
BNP SERPL-MCNC: <10 PG/ML (ref 0–99)
BUN SERPL-MCNC: 12 MG/DL (ref 6–20)
CALCIUM SERPL-MCNC: 9.8 MG/DL (ref 8.7–10.5)
CHLORIDE SERPL-SCNC: 105 MMOL/L (ref 95–110)
CHOLEST SERPL-MCNC: 180 MG/DL (ref 120–199)
CHOLEST/HDLC SERPL: 3.3 {RATIO} (ref 2–5)
CO2 SERPL-SCNC: 21 MMOL/L (ref 23–29)
CREAT SERPL-MCNC: 0.7 MG/DL (ref 0.5–1.4)
D DIMER PPP IA.FEU-MCNC: <0.19 MG/L FEU
DIFFERENTIAL METHOD BLD: ABNORMAL
DIFFERENTIAL METHOD BLD: NORMAL
EOSINOPHIL # BLD AUTO: 0.2 K/UL (ref 0–0.5)
EOSINOPHIL # BLD AUTO: 0.2 K/UL (ref 0–0.5)
EOSINOPHIL NFR BLD: 2.1 % (ref 0–8)
EOSINOPHIL NFR BLD: 2.2 % (ref 0–8)
ERYTHROCYTE [DISTWIDTH] IN BLOOD BY AUTOMATED COUNT: 13.4 % (ref 11.5–14.5)
ERYTHROCYTE [DISTWIDTH] IN BLOOD BY AUTOMATED COUNT: 13.4 % (ref 11.5–14.5)
EST. GFR  (NO RACE VARIABLE): >60 ML/MIN/1.73 M^2
GLUCOSE SERPL-MCNC: 108 MG/DL (ref 70–110)
HCT VFR BLD AUTO: 42.9 % (ref 37–48.5)
HCT VFR BLD AUTO: 43.2 % (ref 37–48.5)
HDLC SERPL-MCNC: 55 MG/DL (ref 40–75)
HDLC SERPL: 30.6 % (ref 20–50)
HGB BLD-MCNC: 14.2 G/DL (ref 12–16)
HGB BLD-MCNC: 14.4 G/DL (ref 12–16)
IMM GRANULOCYTES # BLD AUTO: 0.03 K/UL (ref 0–0.04)
IMM GRANULOCYTES # BLD AUTO: 0.04 K/UL (ref 0–0.04)
IMM GRANULOCYTES NFR BLD AUTO: 0.4 % (ref 0–0.5)
IMM GRANULOCYTES NFR BLD AUTO: 0.4 % (ref 0–0.5)
INR PPP: 1.1 (ref 0.8–1.2)
LDLC SERPL CALC-MCNC: 112.8 MG/DL (ref 63–159)
LIPASE SERPL-CCNC: 17 U/L (ref 4–60)
LYMPHOCYTES # BLD AUTO: 2.6 K/UL (ref 1–4.8)
LYMPHOCYTES # BLD AUTO: 3.8 K/UL (ref 1–4.8)
LYMPHOCYTES NFR BLD: 31.4 % (ref 18–48)
LYMPHOCYTES NFR BLD: 34.4 % (ref 18–48)
MAGNESIUM SERPL-MCNC: 2.1 MG/DL (ref 1.6–2.6)
MCH RBC QN AUTO: 28.7 PG (ref 27–31)
MCH RBC QN AUTO: 29.2 PG (ref 27–31)
MCHC RBC AUTO-ENTMCNC: 33.1 G/DL (ref 32–36)
MCHC RBC AUTO-ENTMCNC: 33.3 G/DL (ref 32–36)
MCV RBC AUTO: 87 FL (ref 82–98)
MCV RBC AUTO: 88 FL (ref 82–98)
MONOCYTES # BLD AUTO: 0.6 K/UL (ref 0.3–1)
MONOCYTES # BLD AUTO: 1.1 K/UL (ref 0.3–1)
MONOCYTES NFR BLD: 7.3 % (ref 4–15)
MONOCYTES NFR BLD: 9.6 % (ref 4–15)
NEUTROPHILS # BLD AUTO: 4.8 K/UL (ref 1.8–7.7)
NEUTROPHILS # BLD AUTO: 5.9 K/UL (ref 1.8–7.7)
NEUTROPHILS NFR BLD: 52.6 % (ref 38–73)
NEUTROPHILS NFR BLD: 58 % (ref 38–73)
NONHDLC SERPL-MCNC: 125 MG/DL
NRBC BLD-RTO: 0 /100 WBC
NRBC BLD-RTO: 0 /100 WBC
PLATELET # BLD AUTO: 232 K/UL (ref 150–450)
PLATELET # BLD AUTO: 261 K/UL (ref 150–450)
PMV BLD AUTO: 11.6 FL (ref 9.2–12.9)
PMV BLD AUTO: 11.7 FL (ref 9.2–12.9)
POTASSIUM SERPL-SCNC: 3.6 MMOL/L (ref 3.5–5.1)
PROT SERPL-MCNC: 7.8 G/DL (ref 6–8.4)
PROTHROMBIN TIME: 11.9 SEC (ref 9–12.5)
RBC # BLD AUTO: 4.93 M/UL (ref 4–5.4)
RBC # BLD AUTO: 4.95 M/UL (ref 4–5.4)
SODIUM SERPL-SCNC: 138 MMOL/L (ref 136–145)
SPECIMEN OUTDATE: NORMAL
T4 FREE SERPL-MCNC: 1.57 NG/DL (ref 0.71–1.51)
TRIGL SERPL-MCNC: 61 MG/DL (ref 30–150)
TROPONIN I SERPL DL<=0.01 NG/ML-MCNC: 0.04 NG/ML (ref 0–0.03)
TROPONIN I SERPL DL<=0.01 NG/ML-MCNC: 0.05 NG/ML (ref 0–0.03)
TROPONIN I SERPL DL<=0.01 NG/ML-MCNC: 0.06 NG/ML (ref 0–0.03)
TROPONIN I SERPL DL<=0.01 NG/ML-MCNC: 0.07 NG/ML (ref 0–0.03)
TROPONIN I SERPL DL<=0.01 NG/ML-MCNC: 0.07 NG/ML (ref 0–0.03)
TROPONIN I SERPL DL<=0.01 NG/ML-MCNC: <0.006 NG/ML (ref 0–0.03)
TSH SERPL DL<=0.005 MIU/L-ACNC: 0.04 UIU/ML (ref 0.4–4)
WBC # BLD AUTO: 11.14 K/UL (ref 3.9–12.7)
WBC # BLD AUTO: 8.34 K/UL (ref 3.9–12.7)

## 2024-12-01 PROCEDURE — 84484 ASSAY OF TROPONIN QUANT: CPT | Mod: 91

## 2024-12-01 PROCEDURE — 96372 THER/PROPH/DIAG INJ SC/IM: CPT

## 2024-12-01 PROCEDURE — 96366 THER/PROPH/DIAG IV INF ADDON: CPT

## 2024-12-01 PROCEDURE — 63600175 PHARM REV CODE 636 W HCPCS

## 2024-12-01 PROCEDURE — 94761 N-INVAS EAR/PLS OXIMETRY MLT: CPT

## 2024-12-01 PROCEDURE — 99900035 HC TECH TIME PER 15 MIN (STAT)

## 2024-12-01 PROCEDURE — 93010 ELECTROCARDIOGRAM REPORT: CPT | Mod: ,,, | Performed by: STUDENT IN AN ORGANIZED HEALTH CARE EDUCATION/TRAINING PROGRAM

## 2024-12-01 PROCEDURE — 85610 PROTHROMBIN TIME: CPT

## 2024-12-01 PROCEDURE — 83735 ASSAY OF MAGNESIUM: CPT

## 2024-12-01 PROCEDURE — 86850 RBC ANTIBODY SCREEN: CPT

## 2024-12-01 PROCEDURE — 25000003 PHARM REV CODE 250: Performed by: EMERGENCY MEDICINE

## 2024-12-01 PROCEDURE — 25000242 PHARM REV CODE 250 ALT 637 W/ HCPCS: Performed by: EMERGENCY MEDICINE

## 2024-12-01 PROCEDURE — 80061 LIPID PANEL: CPT

## 2024-12-01 PROCEDURE — 93005 ELECTROCARDIOGRAM TRACING: CPT

## 2024-12-01 PROCEDURE — 36415 COLL VENOUS BLD VENIPUNCTURE: CPT | Mod: XB | Performed by: INTERNAL MEDICINE

## 2024-12-01 PROCEDURE — 25000003 PHARM REV CODE 250

## 2024-12-01 PROCEDURE — 25000003 PHARM REV CODE 250: Performed by: INTERNAL MEDICINE

## 2024-12-01 PROCEDURE — 85730 THROMBOPLASTIN TIME PARTIAL: CPT

## 2024-12-01 PROCEDURE — 85025 COMPLETE CBC W/AUTO DIFF WBC: CPT

## 2024-12-01 PROCEDURE — 84484 ASSAY OF TROPONIN QUANT: CPT | Performed by: EMERGENCY MEDICINE

## 2024-12-01 PROCEDURE — 96365 THER/PROPH/DIAG IV INF INIT: CPT

## 2024-12-01 PROCEDURE — G0378 HOSPITAL OBSERVATION PER HR: HCPCS

## 2024-12-01 PROCEDURE — 99223 1ST HOSP IP/OBS HIGH 75: CPT | Mod: ,,, | Performed by: INTERNAL MEDICINE

## 2024-12-01 PROCEDURE — 96367 TX/PROPH/DG ADDL SEQ IV INF: CPT

## 2024-12-01 PROCEDURE — 36415 COLL VENOUS BLD VENIPUNCTURE: CPT | Mod: XB

## 2024-12-01 PROCEDURE — 85730 THROMBOPLASTIN TIME PARTIAL: CPT | Mod: 91 | Performed by: INTERNAL MEDICINE

## 2024-12-01 RX ORDER — HEPARIN SODIUM,PORCINE/D5W 25000/250
0-40 INTRAVENOUS SOLUTION INTRAVENOUS CONTINUOUS
Status: DISCONTINUED | OUTPATIENT
Start: 2024-12-01 | End: 2024-12-02 | Stop reason: HOSPADM

## 2024-12-01 RX ORDER — NAPROXEN SODIUM 220 MG/1
81 TABLET, FILM COATED ORAL DAILY
Status: DISCONTINUED | OUTPATIENT
Start: 2024-12-01 | End: 2024-12-02 | Stop reason: HOSPADM

## 2024-12-01 RX ORDER — LEVOTHYROXINE SODIUM 75 UG/1
150 TABLET ORAL
Status: DISCONTINUED | OUTPATIENT
Start: 2024-12-02 | End: 2024-12-02 | Stop reason: HOSPADM

## 2024-12-01 RX ORDER — ALUMINUM HYDROXIDE, MAGNESIUM HYDROXIDE, AND SIMETHICONE 1200; 120; 1200 MG/30ML; MG/30ML; MG/30ML
30 SUSPENSION ORAL ONCE
Status: COMPLETED | OUTPATIENT
Start: 2024-12-01 | End: 2024-12-01

## 2024-12-01 RX ORDER — METOPROLOL SUCCINATE 50 MG/1
50 TABLET, EXTENDED RELEASE ORAL DAILY
Status: DISCONTINUED | OUTPATIENT
Start: 2024-12-01 | End: 2024-12-02 | Stop reason: HOSPADM

## 2024-12-01 RX ORDER — LIDOCAINE HYDROCHLORIDE 20 MG/ML
15 SOLUTION OROPHARYNGEAL ONCE
Status: COMPLETED | OUTPATIENT
Start: 2024-12-01 | End: 2024-12-01

## 2024-12-01 RX ORDER — POTASSIUM CHLORIDE 7.45 MG/ML
10 INJECTION INTRAVENOUS
Status: DISCONTINUED | OUTPATIENT
Start: 2024-12-01 | End: 2024-12-01

## 2024-12-01 RX ORDER — HEPARIN SODIUM 5000 [USP'U]/ML
5000 INJECTION, SOLUTION INTRAVENOUS; SUBCUTANEOUS EVERY 8 HOURS
Status: DISCONTINUED | OUTPATIENT
Start: 2024-12-01 | End: 2024-12-01

## 2024-12-01 RX ORDER — ASPIRIN 325 MG
325 TABLET ORAL
Status: DISCONTINUED | OUTPATIENT
Start: 2024-12-01 | End: 2024-12-01

## 2024-12-01 RX ORDER — ATORVASTATIN CALCIUM 40 MG/1
40 TABLET, FILM COATED ORAL DAILY
Status: DISCONTINUED | OUTPATIENT
Start: 2024-12-01 | End: 2024-12-02 | Stop reason: HOSPADM

## 2024-12-01 RX ORDER — DIPHENHYDRAMINE HCL 25 MG
25 CAPSULE ORAL ONCE
Status: DISCONTINUED | OUTPATIENT
Start: 2024-12-01 | End: 2024-12-01

## 2024-12-01 RX ORDER — HYDROXYZINE PAMOATE 25 MG/1
25 CAPSULE ORAL ONCE AS NEEDED
Status: COMPLETED | OUTPATIENT
Start: 2024-12-01 | End: 2024-12-01

## 2024-12-01 RX ORDER — DIPHENHYDRAMINE HCL 25 MG
25 CAPSULE ORAL NIGHTLY PRN
Status: DISCONTINUED | OUTPATIENT
Start: 2024-12-01 | End: 2024-12-02 | Stop reason: HOSPADM

## 2024-12-01 RX ORDER — LIDOCAINE HYDROCHLORIDE 20 MG/ML
15 SOLUTION OROPHARYNGEAL ONCE
Status: DISCONTINUED | OUTPATIENT
Start: 2024-12-01 | End: 2024-12-01

## 2024-12-01 RX ORDER — CLOPIDOGREL BISULFATE 75 MG/1
600 TABLET ORAL ONCE
Status: COMPLETED | OUTPATIENT
Start: 2024-12-01 | End: 2024-12-01

## 2024-12-01 RX ORDER — PANTOPRAZOLE SODIUM 40 MG/1
40 TABLET, DELAYED RELEASE ORAL DAILY
Status: DISCONTINUED | OUTPATIENT
Start: 2024-12-01 | End: 2024-12-02 | Stop reason: HOSPADM

## 2024-12-01 RX ORDER — LORAZEPAM 1 MG/1
1 TABLET ORAL
Status: DISCONTINUED | OUTPATIENT
Start: 2024-12-01 | End: 2024-12-01

## 2024-12-01 RX ORDER — ASPIRIN 325 MG
325 TABLET ORAL ONCE
Status: DISCONTINUED | OUTPATIENT
Start: 2024-12-01 | End: 2024-12-01

## 2024-12-01 RX ORDER — SODIUM CHLORIDE 0.9 % (FLUSH) 0.9 %
10 SYRINGE (ML) INJECTION
Status: DISCONTINUED | OUTPATIENT
Start: 2024-12-01 | End: 2024-12-02 | Stop reason: HOSPADM

## 2024-12-01 RX ORDER — ALUMINUM HYDROXIDE, MAGNESIUM HYDROXIDE, AND SIMETHICONE 1200; 120; 1200 MG/30ML; MG/30ML; MG/30ML
30 SUSPENSION ORAL ONCE
Status: DISCONTINUED | OUTPATIENT
Start: 2024-12-01 | End: 2024-12-01

## 2024-12-01 RX ADMIN — ASPIRIN 325 MG ORAL TABLET 325 MG: 325 PILL ORAL at 12:12

## 2024-12-01 RX ADMIN — LIDOCAINE HYDROCHLORIDE 15 ML: 20 SOLUTION ORAL at 02:12

## 2024-12-01 RX ADMIN — PANTOPRAZOLE SODIUM 40 MG: 40 TABLET, DELAYED RELEASE ORAL at 05:12

## 2024-12-01 RX ADMIN — NITROGLYCERIN 0.4 MG: 0.4 TABLET SUBLINGUAL at 03:12

## 2024-12-01 RX ADMIN — LEVOTHYROXINE SODIUM 175 MCG: 100 TABLET ORAL at 05:12

## 2024-12-01 RX ADMIN — ALUMINUM HYDROXIDE, MAGNESIUM HYDROXIDE, AND SIMETHICONE 30 ML: 200; 200; 20 SUSPENSION ORAL at 02:12

## 2024-12-01 RX ADMIN — LIDOCAINE HYDROCHLORIDE 15 ML: 20 SOLUTION ORAL at 04:12

## 2024-12-01 RX ADMIN — HEPARIN SODIUM 12 UNITS/KG/HR: 10000 INJECTION, SOLUTION INTRAVENOUS at 11:12

## 2024-12-01 RX ADMIN — DIPHENHYDRAMINE HYDROCHLORIDE 25 MG: 25 CAPSULE ORAL at 09:12

## 2024-12-01 RX ADMIN — HEPARIN SODIUM 9 UNITS/KG/HR: 10000 INJECTION, SOLUTION INTRAVENOUS at 07:12

## 2024-12-01 RX ADMIN — POTASSIUM CHLORIDE 10 MEQ: 7.46 INJECTION, SOLUTION INTRAVENOUS at 09:12

## 2024-12-01 RX ADMIN — ALUMINUM HYDROXIDE, MAGNESIUM HYDROXIDE, AND SIMETHICONE 30 ML: 200; 200; 20 SUSPENSION ORAL at 04:12

## 2024-12-01 RX ADMIN — METOPROLOL SUCCINATE 50 MG: 50 TABLET, EXTENDED RELEASE ORAL at 11:12

## 2024-12-01 RX ADMIN — HYDROXYZINE PAMOATE 25 MG: 25 CAPSULE ORAL at 09:12

## 2024-12-01 RX ADMIN — CLOPIDOGREL BISULFATE 600 MG: 75 TABLET ORAL at 04:12

## 2024-12-01 RX ADMIN — ASPIRIN 81 MG CHEWABLE TABLET 81 MG: 81 TABLET CHEWABLE at 11:12

## 2024-12-01 RX ADMIN — HEPARIN SODIUM 5000 UNITS: 5000 INJECTION INTRAVENOUS; SUBCUTANEOUS at 05:12

## 2024-12-01 NOTE — CONSULTS
[facility] - Telemetry  Cardiology  Consult Note    Patient Name: Amarilys Aguilar  MRN: [id number]  Admission Date: 11/30/2024  Hospital Length of Stay: 0 days  Code Status: Full Code   Attending Provider: Rene Vela MD   Consulting Provider: Dakota Pak MD  Primary Care Physician: Lana Horowitz MD  Principal Problem:ACS (acute coronary syndrome)    Patient information was obtained from patient, past medical records, and ER records.     Inpatient consult to Interventional Cardiology  Consult performed by: Dakota Pak MD  Consult ordered by: Jayde Goldstein MD  Reason for consult: chest pain/NSTEMI        Subjective:     Chief Complaint:    Chest pain     HPI:      54-year-old female past medical history of reflux disease, Hashimoto thyroiditis/hypothyroidism, psoriasis, past history of H pylori infection, under a lot of emotional stress recently, admitted to the hospital with substernal chest pain and heaviness radiating to the left arm and jaw, admitted to the hospital with diagnosis of non ST elevation MI with troponin of 0.07, LDL of 112, EKG shows inferior ST/T-wave changes that are slightly worsened compared to previous EKGs.  Cardiology consulted for diagnosis of non ST elevation MI.        Past Medical History:   Diagnosis Date    GERD (gastroesophageal reflux disease)     Thyroid disease        History reviewed. No pertinent surgical history.    Review of patient's allergies indicates:   Allergen Reactions    Gluten protein        No current facility-administered medications on file prior to encounter.     Current Outpatient Medications on File Prior to Encounter   Medication Sig    levothyroxine (SYNTHROID) 200 MCG tablet Take 175 mcg by mouth once daily.     pantoprazole (PROTONIX) 20 MG tablet Take 1 tablet (20 mg total) by mouth once daily. for 10 days    [DISCONTINUED] dicyclomine (BENTYL) 20 mg tablet Take 1 tablet (20 mg total) by mouth 3 (three) times daily as needed (Abdominal  pain).    [DISCONTINUED] ondansetron (ZOFRAN-ODT) 4 MG TbDL Take 1 tablet (4 mg total) by mouth every 6 (six) hours as needed (Nausea or vomiting).     Family History    None       Tobacco Use    Smoking status: Never    Smokeless tobacco: Not on file   Substance and Sexual Activity    Alcohol use: Yes     Comment: occ    Drug use: No    Sexual activity: Not on file     Review of Systems   Constitutional: Negative for chills and fever.   HENT:  Negative for hearing loss and nosebleeds.    Eyes:  Negative for blurred vision.   Cardiovascular:  Positive for chest pain. Negative for leg swelling and palpitations.   Respiratory:  Negative for hemoptysis and shortness of breath.    Hematologic/Lymphatic: Negative for bleeding problem.   Skin:  Negative for itching.   Musculoskeletal:  Negative for falls.   Gastrointestinal:  Negative for abdominal pain and hematochezia.   Genitourinary:  Negative for hematuria.   Neurological:  Negative for dizziness and loss of balance.   Psychiatric/Behavioral:  Negative for altered mental status and depression.      Objective:     Vital Signs (Most Recent):  Temp: 98 °F (36.7 °C) (12/01/24 1145)  Pulse: 97 (12/01/24 1237)  Resp: 17 (12/01/24 1145)  BP: 123/62 (12/01/24 1145)  SpO2: 95 % (12/01/24 0906) Vital Signs (24h Range):  Temp:  [97.1 °F (36.2 °C)-98 °F (36.7 °C)] 98 °F (36.7 °C)  Pulse:  [] 97  Resp:  [16-22] 17  SpO2:  [95 %-100 %] 95 %  BP: (118-163)/(61-74) 123/62     Weight: 61.2 kg (135 lb)  Body mass index is 23.17 kg/m².    SpO2: 95 %         Intake/Output Summary (Last 24 hours) at 12/1/2024 1357  Last data filed at 12/1/2024 1341  Gross per 24 hour   Intake 360 ml   Output --   Net 360 ml       Lines/Drains/Airways       Peripheral Intravenous Line  Duration                  Peripheral IV - Single Lumen 11/30/24 2353 18 G 1 1/4 in Anterior;Proximal;Right Forearm <1 day                    Physical Exam  Constitutional:       Appearance: She is well-developed.    HENT:      Head: Normocephalic and atraumatic.   Eyes:      Conjunctiva/sclera: Conjunctivae normal.   Neck:      Vascular: No carotid bruit or JVD.   Cardiovascular:      Rate and Rhythm: Normal rate and regular rhythm.      Pulses:           Carotid pulses are 2+ on the right side and 2+ on the left side.       Radial pulses are 2+ on the right side and 2+ on the left side.      Heart sounds: Normal heart sounds. No murmur heard.     No friction rub. No gallop.   Pulmonary:      Effort: Pulmonary effort is normal. No respiratory distress.      Breath sounds: Normal breath sounds. No stridor. No wheezing.   Musculoskeletal:      Cervical back: Neck supple.   Skin:     General: Skin is warm and dry.   Neurological:      Mental Status: She is alert and oriented to person, place, and time.   Psychiatric:         Behavior: Behavior normal.         Significant Labs: All pertinent lab results from the last 24 hours have been reviewed. and   Recent Lab Results  (Last 5 results in the past 24 hours)        12/01/24  1117   12/01/24  0946   12/01/24  0432   12/01/24  0431   12/01/24  0300        PTT 29.7  Comment: Refer to local heparin nomogram for intensity/dose specific   therapeutic   range.  LOT^050^APTT FSL^[id number]                 Baso # 0.06               Basophil % 0.7               Cholesterol Total     180  Comment: The National Cholesterol Education Program (NCEP) has set the  following guidelines (reference ranges) for Cholesterol:  Optimal.....................<200 mg/dL  Borderline High.............200-239 mg/dL  High........................> or = 240 mg/dL             Differential Method Automated               Eos # 0.2               Eos % 2.2               Gran # (ANC) 4.8               Gran % 58.0               Group & Rh       A POS         HDL     55  Comment: The National Cholesterol Education Program (NCEP) has set the  following guidelines (reference values) for HDL Cholesterol:  Low...............<40  mg/dL  Optimal...........>60 mg/dL             HDL/Cholesterol Ratio     30.6           Hematocrit 43.2               Hemoglobin 14.4               Immature Grans (Abs) 0.03  Comment: Mild elevation in immature granulocytes is non specific and   can be seen in a variety of conditions including stress response,   acute inflammation, trauma and pregnancy. Correlation with other   laboratory and clinical findings is essential.                 Immature Granulocytes 0.4               INDIRECT JENNIFER       NEG         INR 1.1  Comment: Coumadin Therapy:  2.0 - 3.0 for INR for all indicators except mechanical heart valves  and antiphospholipid syndromes which should use 2.5 - 3.5.  LOT^040^PT Inn^[id number]                 LDL Cholesterol     112.8  Comment: The National Cholesterol Education Program (NCEP) has set the  following guidelines (reference values) for LDL Cholesterol:  Optimal.......................<130 mg/dL  Borderline High...............130-159 mg/dL  High..........................160-189 mg/dL  Very High.....................>190 mg/dL             Lymph # 2.6               Lymph % 31.4               Magnesium      2.1           MCH 29.2               MCHC 33.3               MCV 88               Mono # 0.6               Mono % 7.3               MPV 11.7               Non-HDL Cholesterol     125  Comment: Risk category and Non-HDL cholesterol goals:  Coronary heart disease (CHD)or equivalent (10-year risk of CHD >20%):  Non-HDL cholesterol goal     <130 mg/dL  Two or more CHD risk factors and 10-year risk of CHD <= 20%:  Non-HDL cholesterol goal     <160 mg/dL  0 to 1 CHD risk factor:  Non-HDL cholesterol goal     <190 mg/dL             nRBC 0               Platelet Count 232               PT 11.9               RBC 4.93               RDW 13.4               Specimen Outdate       12/04/2024 23:59         Total Cholesterol/HDL Ratio     3.3           Triglycerides     61  Comment: The National Cholesterol Education  Program (NCEP) has set the  following guidelines (reference values) for triglycerides:  Normal......................<150 mg/dL  Borderline High.............150-199 mg/dL  High........................200-499 mg/dL             Troponin I   0.071  Comment: The reference interval for Troponin I represents the 99th percentile   cutoff   for our facility and is consistent with 3rd generation assay   performance.       0.059  Comment: The reference interval for Troponin I represents the 99th percentile   cutoff   for our facility and is consistent with 3rd generation assay   performance.     0.040  Comment: The reference interval for Troponin I represents the 99th percentile   cutoff   for our facility and is consistent with 3rd generation assay   performance.         WBC 8.34                                      Significant Imaging: Echocardiogram: Transthoracic echo (TTE) complete (Cupid Only): No results found for this or any previous visit.  Assessment and Plan:     Active Diagnoses:    Diagnosis Date Noted POA    PRINCIPAL PROBLEM:  ACS (acute coronary syndrome) [I24.9] 12/01/2024 Yes      Problems Resolved During this Admission:         -  echocardiogram tomorrow morning.  Patient has a diagnosis of non ST elevation MI.  Continue aspirin 81 mg daily.  I will load her with Plavix 600 mg daily today.  -  Continue high-intensity statin.    -  continue Toprol-XL 50 mg daily.  -   Continue on telemetry.  NPO after midnight.  Left heart catheterization tomorrow morning.    VTE Risk Mitigation (From admission, onward)           Ordered     heparin 25,000 units in dextrose 5% (100 units/ml) IV bolus from bag LOW INTENSITY nomogram - [facility]  As needed (PRN)        Question:  Heparin Infusion Adjustment (DO NOT MODIFY ANSWER)  Answer:  [email or web address]\epic\Images\Pharmacy\HeparinInfusions\heparin LOW INTENSITY nomogram for [facility] [id number].pdf    12/01/24 1101     heparin 25,000 units in dextrose 5% (100 units/ml) IV bolus from bag LOW  INTENSITY nomogram - [facility]  As needed (PRN)        Question:  Heparin Infusion Adjustment (DO NOT MODIFY ANSWER)  Answer:  [email or web address]\epic\Images\Pharmacy\HeparinInfusions\heparin LOW INTENSITY nomogram for [facility] [id number].pdf    12/01/24 1101     heparin 25,000 units in dextrose 5% 250 mL (100 units/mL) infusion LOW INTENSITY nomogram - [facility]  Continuous        Question:  Begin at (units/kg/hr)  Answer:  12    12/01/24 1101     IP VTE HIGH RISK PATIENT  Once         12/01/24 0349     Place sequential compression device  Until discontinued         12/01/24 0349                    Thank you for your consult. I will follow-up with patient. Please contact us if you have any additional questions.    Dakota Pak MD  Cardiology   [facility] - Telemetry

## 2024-12-01 NOTE — PLAN OF CARE
VIRTUAL NURSE:  Called into patient's room via telephone. The room patient is in do not have VN equipment available.  Introduced as VN for night shift that will be working with floor nurse and nursing assistant.  Educated patient on VN's role in patient care and  VIP model.  Plan of care reviewed with patient.  Education per flowsheet.   Informed patient that staff will round on them every 2 hours but to use call light for any other needs they may have; informed of fall risk and fall precautions.  Patient verbalized understanding.  Asked if call light is within reach; bed siderails up x2.  Opportunity given for questions and questions answered.  Admission assessment questions answered.  Patient requests and needs were placed in orders & relayed to bedside RN. Instructed to call for assistance.  Will cont to monitor and intervene as needed.    Labs, notes, orders, and careplan initiated.   Problem: Adult Inpatient Plan of Care  Goal: Plan of Care Review  Outcome: Progressing  Goal: Patient-Specific Goal (Individualized)  Outcome: Progressing      12/01/24 0555   Nurse Notification   Bedside Nurse Notified? Yes   Name of Bedside Nurse PARAG Zelaya   Nurse Notfication Method Secure Chat   Nurse Notified Of Other  (Admission profile completed)   Admission   Initial VN Admission Questions Complete   Communication Issues?   (This patient's room does not have VN equipment)   Shift   Virtual Nurse - Rounding Complete   Virtual Nurse - Patient Verbalized Approval Of VN Rounding   Type of Frequent Check   Type Patient Rounds;Telemetry Monitoring   Safety/Activity   Safety Promotion/Fall Prevention Fall Risk reviewed with patient/family   Pain/Comfort/Sleep   Preferred Pain Scale number (Numeric Rating Pain Scale)   Comfort/Acceptable Pain Level 0   Pain Rating (0-10): Rest 3   Pain Rating (0-10): Activity 3   Sleep/Rest/Relaxation awake   Additional Pain Site Documentation Pain Assessment: Number Scale (0-10) (LDA)   Cardiac    Cardiac/Telemetry Monitor On Yes   OTHER   Cardiac/Telemetry Audible Yes

## 2024-12-01 NOTE — H&P
[facility] Medicine H&P Note     Admitting Team: [facility] Hospitalist Team B  Attending Physician: Rene Vela MD  Resident: Triston  Intern: Ivory    Date of Admit: 11/30/2024    Chief Complaint     Chest pain x 5 hours    Subjective:      History of Present Illness:  Amarilys Aguilar is a 54 y.o. female with history of GERD, hypothyroidism 2/2 Hashimoto's, psoriasis, and remote hx of H. pylori who presented to [facility] on 11/30/2024 with a primary complaint of substernal chest pain that began around 2330 while showering.    The patient was in their usual state of health until approximately 2330 while showering before bed when she began experiencing chest tightness that transitioned into intense substernal chest pressure after approximately 1 minute. She also endorses experiencing nausea as well as left arm and left jaw pain for approximately 3-5 minutes that has since resolved. Her chest discomfort has been intermittent since arriving to the ED. She denies experiencing anything like this previously. She denies experiencing any diaphoresis, palpitations, SOB, headache, dizziness, abdominal pain, fever, chills, or vomiting.    Of note, she states that her now ex-fiance ended their relationship this past Wednesday, one day before Thanksgiving and two days before her birthday. She believes that the stress from this could be contributing to her presentation.    She also states that she has been experiencing decreased appetite over the past two weeks due to chronic reflux symptoms and has currently been eating spinach, sauerkraut, and some fruit. She has trialed TUMS but states that it does not relieve her symptoms.      Past Medical History:  Gastroesophageal Reflux Disease  Hypothyroidism Secondary to Hashimoto's Thyroiditis  Prediabetes  Psoriasis  Vitamin D Deficiency    Past Surgical History:  None per patient    Allergies:  NKDA per patient    Home Medications:  Levothyroxine 175  "[facility] PO qam    Family History:  Mother: COPD  Father: heart disease, DM  Half sister: heart disease, MI (60's)  Half brother x 2: healthy  Maternal grandmother: lung cancer  Maternal grandfather: cancer  Maternal aunt: ovarian cancer  Paternal grandmother: heart disease    Social History:  Tobacco: never smoker  EtOH: seldom has glass of wine  Drug: none  Lives with: ex-fiance  Work:       Review of Systems:  Pertinent items are noted in HPI. All other systems are reviewed and are negative.      Health Maintaince :   Primary Care Physician: Lana Horowitz MD    Immunizations:     There is no immunization history on file for this patient.    Tdap: not UTD  Flu: not UTD  Pna: not yet indicated  Shingrix: not UTD  COVID-19: not UTD    Cancer Screening:  PAP: not UTD, patient estimates was last done ~ 10 years ago  MMG: last performed last year per patient  Colonoscopy: never performed       Objective:   Last 24 Hour Vital Signs:  BP  Min: 134/61  Max: 163/74  Temp  Av °F (36.7 °C)  Min: 98 °F (36.7 °C)  Max: 98 °F (36.7 °C)  Pulse  Av.3  Min: 90  Max: 113  Resp  Av.5  Min: 16  Max: 22  SpO2  Av.7 %  Min: 96 %  Max: 100 %  Height  Av' 4" (162.6 cm)  Min: 5' 4" (162.6 cm)  Max: 5' 4" (162.6 cm)  Weight  Av.2 kg (135 lb)  Min: 61.2 kg (135 lb)  Max: 61.2 kg (135 lb)  Body mass index is 23.17 kg/m².  No intake/output data recorded.    Physical Examination:  General: WNWD adult female reclined in bed in NAD  HEENT: NC/AT, EOMI, MMM  Neck: Supple, trachea midline, no JVD  CV: RRR, normal S1/S2, no m/c/g/r appreciated  Resp: CTAB, no wheezing or rhonchi appreciated, normal respiratory effort  Abd: Soft, NT/ND, normoactive bowel sounds  Ext: 2+ pulses, no edema  Skin: Warm, dry, intact, no rash or erythema appreciated on exposed skin  Neuro: AAOx3, no obvious focal deficits, spontaneously moving extremities  Psych: Pleasant mood, somewhat anxious/sad affect, cooperative with " exam    Laboratory:  Most Recent Data:  CBC:   Lab Results   Component Value Date    WBC 11.14 11/30/2024    HGB 14.2 11/30/2024    HCT 42.9 11/30/2024     11/30/2024    MCV 87 11/30/2024    RDW 13.4 11/30/2024     BMP:   Lab Results   Component Value Date     11/30/2024    K 3.6 11/30/2024     11/30/2024    CO2 21 (L) 11/30/2024    BUN 12 11/30/2024    CREATININE 0.7 11/30/2024     11/30/2024    CALCIUM 9.8 11/30/2024    MG 2.0 06/01/2023     LFTs:   Lab Results   Component Value Date    PROT 7.8 11/30/2024    ALBUMIN 4.5 11/30/2024    BILITOT 0.5 11/30/2024    AST 20 11/30/2024    ALKPHOS 107 11/30/2024    ALT 21 11/30/2024     DM:   Lab Results   Component Value Date    HGBA1C 5.8 (H) 12/18/2020    CREATININE 0.7 11/30/2024     Thyroid:   Lab Results   Component Value Date    TSH 0.035 (L) 11/30/2024    FREET4 1.57 (H) 11/30/2024     Cardiac:   Lab Results   Component Value Date    TROPONINI 0.040 (H) 12/01/2024    BNP <10 11/30/2024     Urinalysis:   Lab Results   Component Value Date    LABURIN No significant growth 10/17/2013    COLORU Colorless (A) 03/15/2023    SPECGRAV 1.005 03/15/2023    NITRITE Negative 03/15/2023    KETONESU 1+ (A) 03/15/2023    UROBILINOGEN Negative 03/15/2023    WBCUA 2 10/17/2013       Trended Lab Data:  Recent Labs   Lab 11/30/24 2352   WBC 11.14   HGB 14.2   HCT 42.9      MCV 87   RDW 13.4      K 3.6      CO2 21*   BUN 12   CREATININE 0.7      PROT 7.8   ALBUMIN 4.5   BILITOT 0.5   AST 20   ALKPHOS 107   ALT 21       Trended Cardiac Data:  Recent Labs   Lab 11/30/24  2352 12/01/24  0300   TROPONINI <0.006 0.040*   BNP <10  --        Microbiology Data:  None      Other Results:  EKG (my interpretation): Sinus tachycardia at 112 bpm. No significant ST elevations or depressions. T wave inversion in lead III.      Imaging:  Imaging Results              X-Ray Chest AP Portable (Final result)  Result time 12/01/24 00:25:04      Final  result by Willis Soto DO (12/01/24 00:25:04)                   Impression:      No acute abnormality.      Electronically signed by: Willis Soto  Date:    12/01/2024  Time:    00:25               Narrative:    EXAMINATION:  XR CHEST AP PORTABLE    CLINICAL HISTORY:  Chest pain, unspecified    TECHNIQUE:  Single frontal view of the chest was performed.    COMPARISON:  10/17/2013.    FINDINGS:  The lungs are well expanded and clear. No focal opacities are seen. The pleural spaces are clear. The cardiac silhouette is unremarkable. The visualized osseous structures are unremarkable.                                    Assessment and Plan:     Amarilys Aguilar is a 54 y.o. female with history of GERD, hypothyroidism 2/2 Hashimoto's, psoriasis, and remote hx of H. pylori who presented to [facility] on 11/30/2024 with a primary complaint of substernal chest pain that began around 2330 while showering and was admitted to [facility] for concern for ACS after elevation of repeat troponin. Will continue to trend troponin and EKG at this time and check TTE.    Chest Pain and Troponin Elevation Concerning for Acute Coronary Syndrome  Patient presented with complaint of intermittent substernal chest pressure that began around 2330 with associated left arm and jaw pain that has since subsided. Initial troponin negative but repeat was elevated at 0.040. Initial EKG was mostly reassuring. S/p ASA load in ED. She was trialed on NTG in the ED but states that it made her SOB.  Plan:  - Continue to trend troponin q4h  - Recheck EKG now then trend with repeat troponin  - Check TTE  - Monitor on Tele  - Consider Cardiology consult if troponin continue to up-trend    Gastroesophageal Reflux Disease  Remote Hx of H. Pylori Infection  Patient endorses long-standing hx of uncontrolled GERD as well as remote hx of H. pylori infection. She has trialed TUMS for symptom control but states that it is  ineffective.   Plan:  - Trial Pantoprazole 40 mg daily, can increase to bid if symptoms not fully controlled  - Continue to monitor symptoms    Hypothyroidism Secondary to Hashimoto's Thyroiditis  Patient is currently prescribed Levothyroxine 175 mcg qam. TSH and free T4 obtained in the ED were 0.035 and 1.57, respectively.   Plan:  - Continue home Levothyroxine  - Recommend follow up with PCP upon discharge    Psoriasis  Patient endorses hx of psoriasis but is not currently taking any medications for treatment.  Plan:  - No acute interventions at this time  - Refer back to PCP upon discharge    Healthcare Maintenance  Plan:  - Plan to further discuss importance of staying up to date with immunizations prior to discharge  - Plan to refer to OB/GYN to establish care and for Pap smear prior to discharge  - Patient has ColoGuard kit at home but has not completed specimen collection, discuss importance  - Plan to refer for screening mammogram prior to discharge      Code Status: Full Code  VTE Ppx: Heparin SQ  Diet: NPO currently    Disposition: Admit to [facility] for ACS rule out    Estimated LOS: 1-2 days      Everette Torres DO  [facility], PGY-III  [facility] Night Float    [facility] Hospitalist Pager numbers:   [facility] Hospitalist Medicine Team A (Dane/Lester): [phone]  [facility] Hospitalist Medicine Team B (Gigi/Latha):  [phone]

## 2024-12-01 NOTE — ED PROVIDER NOTES
Encounter Date: 11/30/2024       History     Chief Complaint   Patient presents with    Chest Pain     Patient stated to registration that she feels like she is having a heart attack. Complaining of severe midsternal chest pain and feels like an elephant is on her chest. Patient also reports that she had a dizzy spell while driving. [facility] hashimotos      Patient is a 54-year-old female with a history of thyroid disease, GERD who presents to the ED with complaint of chest pain.  Patient states that approximately 30 minutes prior to arrival she had acute onset of chest pressure radiating to the bilateral upper chest and radiating to her back.  She states that this started while she was taking a bath.  She states it lasted approximately 15 minutes and has resolved.  She denies any associated shortness of breath, numbness tingling radiation of said pain to her neck or upper extremity.  She denies any palpitations, near-syncope or syncope associated with the aforementioned chest pain complaint.  She denies any significant cardiac history.  She denies any risk factors for venous thromboembolism.  Patient does report that this week has been particularly stressful for her as her fiance of 15 years broke up with her.  Additionally, she does report a self diagnosis of gluten insensitivity and states that over the past week she has been having generalized abdominal pain.  She denies any significant pain at this time and denies any nausea vomiting with her chest pain or abdominal pain complaint.      Review of patient's allergies indicates:  No Known Allergies  Past Medical History:   Diagnosis Date    GERD (gastroesophageal reflux disease)     Thyroid disease      History reviewed. No pertinent surgical history.  No family history on file.  Social History     Tobacco Use    Smoking status: Never   Substance Use Topics    Alcohol use: Yes     Comment: occ    Drug use: No     Review of Systems   Constitutional:  Negative for  chills and fever.   Respiratory:  Negative for shortness of breath.    Cardiovascular:  Positive for chest pain (Resolved).   Neurological:  Negative for dizziness, syncope, light-headedness and headaches.       Physical Exam     Initial Vitals [11/30/24 2335]   BP Pulse Resp Temp SpO2   (!) 142/62 (!) 113 (!) 22 98 °F (36.7 °C) 100 %      MAP       --         Physical Exam    Constitutional: She appears well-developed and well-nourished. No distress.   Anxious-appearing  No acute distress noted    HENT:   Head: Normocephalic and atraumatic.   Eyes: EOM are normal. Pupils are equal, round, and reactive to light.   Neck: Neck supple.   Normal range of motion.  Cardiovascular:  Regular rhythm, normal heart sounds and intact distal pulses.           Tachycardic   Carotid pulse 2+  Radial pulse 2+      Pulmonary/Chest: Breath sounds normal.   Abdominal: Abdomen is soft. Bowel sounds are normal.   Abdomen soft, NT to deep palpation; normal bowel sounds throughout; no rebound; no guarding    Musculoskeletal:         General: No tenderness or edema. Normal range of motion.      Cervical back: Normal range of motion and neck supple.      Comments: No lower extremity tenderness or edema noted.      Neurological: She is alert and oriented to person, place, and time. She has normal strength.   No focal neurological deficit appreciated on exam.   Sensation grossly in tact.   MAEW  GCS 15   AAOX3    Skin: Skin is warm. Capillary refill takes less than 2 seconds.   Psychiatric: She has a normal mood and affect.   Anxious appearing.          ED Course   Procedures  Labs Reviewed   CBC W/ AUTO DIFFERENTIAL - Abnormal       Result Value    WBC 11.14      RBC 4.95      Hemoglobin 14.2      Hematocrit 42.9      MCV 87      MCH 28.7      MCHC 33.1      RDW 13.4      Platelets 261      MPV 11.6      Immature Granulocytes 0.4      Gran # (ANC) 5.9      Immature Grans (Abs) 0.04      Lymph # 3.8      Mono # 1.1 (*)     Eos # 0.2      Baso  # 0.10      nRBC 0      Gran % 52.6      Lymph % 34.4      Mono % 9.6      Eosinophil % 2.1      Basophil % 0.9      Differential Method Automated     COMPREHENSIVE METABOLIC PANEL - Abnormal    Sodium 138      Potassium 3.6      Chloride 105      CO2 21 (*)     Glucose 108      BUN 12      Creatinine 0.7      Calcium 9.8      Total Protein 7.8      Albumin 4.5      Total Bilirubin 0.5      Alkaline Phosphatase 107      AST 20      ALT 21      eGFR >60      Anion Gap 12     B-TYPE NATRIURETIC PEPTIDE    BNP <10     D DIMER, QUANTITATIVE    D-Dimer <0.19     LIPASE   LIPASE    Lipase 17     TROPONIN I   TSH   TROPONIN I          Imaging Results              X-Ray Chest AP Portable (Final result)  Result time 12/01/24 00:25:04      Final result by Willis Soto DO (12/01/24 00:25:04)                   Impression:      No acute abnormality.      Electronically signed by: Willis Soto  Date:    12/01/2024  Time:    00:25               Narrative:    EXAMINATION:  XR CHEST AP PORTABLE    CLINICAL HISTORY:  Chest pain, unspecified    TECHNIQUE:  Single frontal view of the chest was performed.    COMPARISON:  10/17/2013.    FINDINGS:  The lungs are well expanded and clear. No focal opacities are seen. The pleural spaces are clear. The cardiac silhouette is unremarkable. The visualized osseous structures are unremarkable.                                       Medications   nitroGLYCERIN SL tablet 0.4 mg (0.4 mg Sublingual Not Given 12/1/24 0008)   LORazepam tablet 1 mg (has no administration in time range)   aspirin tablet 325 mg (325 mg Oral Given 12/1/24 0000)     Medical Decision Making             ED Course as of 12/01/24 1613   Sun Dec 01, 2024   0018 D-Dimer: <0.19 [LC]   0018 WBC: 11.14 [LC]   0018 Hemoglobin: 14.2 [LC]   0018 Hematocrit: 42.9 [LC]   0054 BNP: <10 [LC]   0054 X-Ray Chest AP Portable  No acute cardiopulmonary process identified on this one view plain radiograph of the chest per my independent  interpretation. [LC]   0115 Chest pain radiating to the back.  EKG shows sinus tachycardia.  Chest pain resolved prior to arrival.  Initial troponin negative.  D-dimer negative.  Awaiting 2nd troponin.  Patient has been having stress secondary to recent break-up with significant other.  Low heart score. [JS]   0115 Awaiting 2nd troponin. [JS]   0230 I reassessed the patient.  She states she has been having reflux like symptoms and some left upper quadrant abdominal discomfort with dyspepsia.  I suspect this is reflux and will give her a GI cocktail.  Troponin negative as well as D-dimer and BNP.  Chest x-ray within normal limits.  I doubt this is acute coronary syndrome pulmonary embolism pneumothorax pneumonia heart failure perforated viscus pancreatitis. [JS]   0320 Second troponin slightly elevated to 0.04.  Given elevation in troponin I think it would be best to admit the patient in the hospital for further evaluation [JS]   0330 Second troponin slightly positive at 0.040.  Given the elevation will place in observation for likely stress test.  Will repeat EKG and give the patient a dose of nitroglycerin given her mild chest discomfort at this time.  GI cocktail did improve her symptoms but then they returned.  Patient does not have any real risk factors for cardiac disease . [JS]   0352 EKG independently interpreted by me as rate 90 normal sinus rate rhythm axis intervals with nonspecific T-wave inversion lead 3 no ST segment elevation or depression. [JS]      ED Course User Index  [JS] Jefferson Pardon MD  [LC] Aj Rebollar MD                 Medical Decision Making:   Initial Assessment:   See HPI   Clinical Tests:   Lab Tests: Ordered  Radiological Study: Ordered and Reviewed  ED Management:   - ED workup pending as of shift change  - care of patient assumed by overnight ED physician, Dr. Dawson Padron, as of shift change. Dr. Padron was extensively updated regarding the patient's presentation and  emergency department work up. Dr. Padron will ultimately be responsible for final plan and disposition of this patient. Please see Dr. Padron's  notes/updates for further details/plan of care                 Clinical Impression:  Final diagnoses:  [R07.9] Chest pain                 Aj Rebollar MD  12/01/24 0110       Aj Rebollar MD  12/01/24 161

## 2024-12-01 NOTE — ED NOTES
Pt states no relief with Nitro.  Pt states onset of headache.  Pt states has been under a lot of stress with recent break-up.  Pt describes pain in chest as a tightness across chest.  Pt on cardiac monitor.  Pt A/O x 3.

## 2024-12-02 VITALS
DIASTOLIC BLOOD PRESSURE: 69 MMHG | BODY MASS INDEX: 23.04 KG/M2 | TEMPERATURE: 98 F | WEIGHT: 134.94 LBS | HEIGHT: 64 IN | OXYGEN SATURATION: 98 % | SYSTOLIC BLOOD PRESSURE: 137 MMHG | HEART RATE: 73 BPM | RESPIRATION RATE: 18 BRPM

## 2024-12-02 PROBLEM — R07.9 CHEST PAIN: Status: ACTIVE | Noted: 2024-12-02

## 2024-12-02 PROBLEM — I21.4 NSTEMI (NON-ST ELEVATED MYOCARDIAL INFARCTION): Status: ACTIVE | Noted: 2024-12-02

## 2024-12-02 PROBLEM — R79.89 ELEVATED TROPONIN: Status: ACTIVE | Noted: 2024-12-02

## 2024-12-02 PROBLEM — R07.9 CHEST PAIN: Status: RESOLVED | Noted: 2024-12-02 | Resolved: 2024-12-02

## 2024-12-02 LAB
ALBUMIN SERPL BCP-MCNC: 4 G/DL (ref 3.5–5.2)
ALP SERPL-CCNC: 95 U/L (ref 40–150)
ALT SERPL W/O P-5'-P-CCNC: 16 U/L (ref 10–44)
ANION GAP SERPL CALC-SCNC: 12 MMOL/L (ref 8–16)
APTT PPP: 66.9 SEC (ref 21–32)
APTT PPP: 90.3 SEC (ref 21–32)
AST SERPL-CCNC: 15 U/L (ref 10–40)
AV INDEX (PROSTH): 0.71
AV MEAN GRADIENT: 4.5 MMHG
AV PEAK GRADIENT: 6.8 MMHG
AV VALVE AREA BY VELOCITY RATIO: 2.2 CM²
AV VALVE AREA: 2.2 CM²
AV VELOCITY RATIO: 0.69
BASOPHILS # BLD AUTO: 0.06 K/UL (ref 0–0.2)
BASOPHILS # BLD AUTO: 0.06 K/UL (ref 0–0.2)
BASOPHILS NFR BLD: 0.6 % (ref 0–1.9)
BASOPHILS NFR BLD: 0.6 % (ref 0–1.9)
BILIRUB SERPL-MCNC: 0.5 MG/DL (ref 0.1–1)
BSA FOR ECHO PROCEDURE: 1.66 M2
BUN SERPL-MCNC: 12 MG/DL (ref 6–20)
CALCIUM SERPL-MCNC: 9.5 MG/DL (ref 8.7–10.5)
CHLORIDE SERPL-SCNC: 107 MMOL/L (ref 95–110)
CO2 SERPL-SCNC: 22 MMOL/L (ref 23–29)
CREAT SERPL-MCNC: 0.8 MG/DL (ref 0.5–1.4)
CV ECHO LV RWT: 0.34 CM
DIFFERENTIAL METHOD BLD: NORMAL
DIFFERENTIAL METHOD BLD: NORMAL
DOP CALC AO PEAK VEL: 1.3 M/S
DOP CALC AO VTI: 25.1 CM
DOP CALC LVOT AREA: 3.1 CM2
DOP CALC LVOT DIAMETER: 2 CM
DOP CALC LVOT PEAK VEL: 0.9 M/S
DOP CALC LVOT STROKE VOLUME: 55.9 CM3
DOP CALC MV VTI: 24.3 CM
DOP CALCLVOT PEAK VEL VTI: 17.8 CM
E WAVE DECELERATION TIME: 129.89 MSEC
E/A RATIO: 0.93
E/E' RATIO: 8.21 M/S
ECHO LV POSTERIOR WALL: 0.8 CM (ref 0.6–1.1)
EOSINOPHIL # BLD AUTO: 0.2 K/UL (ref 0–0.5)
EOSINOPHIL # BLD AUTO: 0.2 K/UL (ref 0–0.5)
EOSINOPHIL NFR BLD: 2.3 % (ref 0–8)
EOSINOPHIL NFR BLD: 2.3 % (ref 0–8)
ERYTHROCYTE [DISTWIDTH] IN BLOOD BY AUTOMATED COUNT: 13.5 % (ref 11.5–14.5)
ERYTHROCYTE [DISTWIDTH] IN BLOOD BY AUTOMATED COUNT: 13.5 % (ref 11.5–14.5)
EST. GFR  (NO RACE VARIABLE): >60 ML/MIN/1.73 M^2
FRACTIONAL SHORTENING: 23.4 % (ref 28–44)
GLUCOSE SERPL-MCNC: 103 MG/DL (ref 70–110)
HCT VFR BLD AUTO: 42.4 % (ref 37–48.5)
HCT VFR BLD AUTO: 42.4 % (ref 37–48.5)
HGB BLD-MCNC: 14.1 G/DL (ref 12–16)
HGB BLD-MCNC: 14.1 G/DL (ref 12–16)
IMM GRANULOCYTES # BLD AUTO: 0.02 K/UL (ref 0–0.04)
IMM GRANULOCYTES # BLD AUTO: 0.02 K/UL (ref 0–0.04)
IMM GRANULOCYTES NFR BLD AUTO: 0.2 % (ref 0–0.5)
IMM GRANULOCYTES NFR BLD AUTO: 0.2 % (ref 0–0.5)
INTERVENTRICULAR SEPTUM: 0.8 CM (ref 0.6–1.1)
IVC DIAMETER: 1.32 CM
LEFT ATRIUM AREA SYSTOLIC (APICAL 2 CHAMBER): 12.69 CM2
LEFT ATRIUM AREA SYSTOLIC (APICAL 4 CHAMBER): 12.08 CM2
LEFT ATRIUM VOLUME INDEX MOD: 17 ML/M2
LEFT ATRIUM VOLUME MOD: 28.01 ML
LEFT INTERNAL DIMENSION IN SYSTOLE: 3.6 CM (ref 2.1–4)
LEFT VENTRICLE DIASTOLIC VOLUME INDEX: 62.19 ML/M2
LEFT VENTRICLE DIASTOLIC VOLUME: 102.62 ML
LEFT VENTRICLE END SYSTOLIC VOLUME APICAL 2 CHAMBER: 29.67 ML
LEFT VENTRICLE END SYSTOLIC VOLUME APICAL 4 CHAMBER: 22.6 ML
LEFT VENTRICLE MASS INDEX: 74.1 G/M2
LEFT VENTRICLE SYSTOLIC VOLUME INDEX: 33.3 ML/M2
LEFT VENTRICLE SYSTOLIC VOLUME: 54.92 ML
LEFT VENTRICULAR INTERNAL DIMENSION IN DIASTOLE: 4.7 CM (ref 3.5–6)
LEFT VENTRICULAR MASS: 122.3 G
LV LATERAL E/E' RATIO: 6.5 M/S
LV SEPTAL E/E' RATIO: 11.14 M/S
LVED V (TEICH): 102.62 ML
LVES V (TEICH): 54.92 ML
LVOT MG: 1.57 MMHG
LVOT MV: 0.59 CM/S
LYMPHOCYTES # BLD AUTO: 4.2 K/UL (ref 1–4.8)
LYMPHOCYTES # BLD AUTO: 4.2 K/UL (ref 1–4.8)
LYMPHOCYTES NFR BLD: 45.1 % (ref 18–48)
LYMPHOCYTES NFR BLD: 45.1 % (ref 18–48)
MCH RBC QN AUTO: 28.8 PG (ref 27–31)
MCH RBC QN AUTO: 28.8 PG (ref 27–31)
MCHC RBC AUTO-ENTMCNC: 33.3 G/DL (ref 32–36)
MCHC RBC AUTO-ENTMCNC: 33.3 G/DL (ref 32–36)
MCV RBC AUTO: 87 FL (ref 82–98)
MCV RBC AUTO: 87 FL (ref 82–98)
MONOCYTES # BLD AUTO: 0.9 K/UL (ref 0.3–1)
MONOCYTES # BLD AUTO: 0.9 K/UL (ref 0.3–1)
MONOCYTES NFR BLD: 9.1 % (ref 4–15)
MONOCYTES NFR BLD: 9.1 % (ref 4–15)
MV MEAN GRADIENT: 1 MMHG
MV PEAK A VEL: 0.84 M/S
MV PEAK E VEL: 0.78 M/S
MV PEAK GRADIENT: 2 MMHG
MV VALVE AREA BY CONTINUITY EQUATION: 2.3 CM2
NEUTROPHILS # BLD AUTO: 4 K/UL (ref 1.8–7.7)
NEUTROPHILS # BLD AUTO: 4 K/UL (ref 1.8–7.7)
NEUTROPHILS NFR BLD: 42.7 % (ref 38–73)
NEUTROPHILS NFR BLD: 42.7 % (ref 38–73)
NRBC BLD-RTO: 0 /100 WBC
NRBC BLD-RTO: 0 /100 WBC
OHS CV RV/LV RATIO: 0.62 CM
OHS QRS DURATION: 80 MS
OHS QRS DURATION: 82 MS
OHS QRS DURATION: 84 MS
OHS QRS DURATION: 84 MS
OHS QRS DURATION: 88 MS
OHS QTC CALCULATION: 450 MS
OHS QTC CALCULATION: 452 MS
OHS QTC CALCULATION: 457 MS
OHS QTC CALCULATION: 460 MS
OHS QTC CALCULATION: 469 MS
PISA MRMAX VEL: 6.03 M/S
PLATELET # BLD AUTO: 218 K/UL (ref 150–450)
PLATELET # BLD AUTO: 218 K/UL (ref 150–450)
PMV BLD AUTO: 12.3 FL (ref 9.2–12.9)
PMV BLD AUTO: 12.3 FL (ref 9.2–12.9)
POTASSIUM SERPL-SCNC: 3.8 MMOL/L (ref 3.5–5.1)
PROT SERPL-MCNC: 7.1 G/DL (ref 6–8.4)
PULM VEIN S/D RATIO: 1.18
PV MV: 0.59 M/S
PV PEAK D VEL: 0.33 M/S
PV PEAK GRADIENT: 2 MMHG
PV PEAK S VEL: 0.39 M/S
PV PEAK VELOCITY: 0.76 M/S
RA PRESSURE ESTIMATED: 3 MMHG
RA VOL SYS: 22.72 ML
RBC # BLD AUTO: 4.89 M/UL (ref 4–5.4)
RBC # BLD AUTO: 4.89 M/UL (ref 4–5.4)
RIGHT ATRIAL AREA: 10.2 CM2
RIGHT ATRIUM VOLUME AREA LENGTH APICAL 4 CHAMBER: 20.88 ML
RIGHT VENTRICLE DIASTOLIC BASEL DIMENSION: 2.9 CM
RV TISSUE DOPPLER FREE WALL SYSTOLIC VELOCITY 1 (APICAL 4 CHAMBER VIEW): 8.3 CM/S
SINUS: 3.07 CM
SODIUM SERPL-SCNC: 141 MMOL/L (ref 136–145)
STJ: 2.83 CM
TASV: 8 CM/S
TDI LATERAL: 0.12 M/S
TDI SEPTAL: 0.07 M/S
TDI: 0.1 M/S
TRICUSPID ANNULAR PLANE SYSTOLIC EXCURSION: 1.43 CM
WBC # BLD AUTO: 9.4 K/UL (ref 3.9–12.7)
WBC # BLD AUTO: 9.4 K/UL (ref 3.9–12.7)
Z-SCORE OF LEFT VENTRICULAR DIMENSION IN END DIASTOLE: 0.15
Z-SCORE OF LEFT VENTRICULAR DIMENSION IN END SYSTOLE: 1.79

## 2024-12-02 PROCEDURE — C1887 CATHETER, GUIDING: HCPCS | Performed by: INTERNAL MEDICINE

## 2024-12-02 PROCEDURE — 85730 THROMBOPLASTIN TIME PARTIAL: CPT | Performed by: INTERNAL MEDICINE

## 2024-12-02 PROCEDURE — 25500020 PHARM REV CODE 255: Performed by: INTERNAL MEDICINE

## 2024-12-02 PROCEDURE — 93010 ELECTROCARDIOGRAM REPORT: CPT | Mod: ,,, | Performed by: INTERNAL MEDICINE

## 2024-12-02 PROCEDURE — 25000003 PHARM REV CODE 250

## 2024-12-02 PROCEDURE — 93458 L HRT ARTERY/VENTRICLE ANGIO: CPT | Mod: 26,,, | Performed by: INTERNAL MEDICINE

## 2024-12-02 PROCEDURE — 94761 N-INVAS EAR/PLS OXIMETRY MLT: CPT

## 2024-12-02 PROCEDURE — 99152 MOD SED SAME PHYS/QHP 5/>YRS: CPT | Performed by: INTERNAL MEDICINE

## 2024-12-02 PROCEDURE — 25000003 PHARM REV CODE 250: Performed by: INTERNAL MEDICINE

## 2024-12-02 PROCEDURE — 36415 COLL VENOUS BLD VENIPUNCTURE: CPT | Performed by: INTERNAL MEDICINE

## 2024-12-02 PROCEDURE — 93005 ELECTROCARDIOGRAM TRACING: CPT

## 2024-12-02 PROCEDURE — 96366 THER/PROPH/DIAG IV INF ADDON: CPT

## 2024-12-02 PROCEDURE — 99152 MOD SED SAME PHYS/QHP 5/>YRS: CPT | Mod: ,,, | Performed by: INTERNAL MEDICINE

## 2024-12-02 PROCEDURE — 80053 COMPREHEN METABOLIC PANEL: CPT

## 2024-12-02 PROCEDURE — C1894 INTRO/SHEATH, NON-LASER: HCPCS | Performed by: INTERNAL MEDICINE

## 2024-12-02 PROCEDURE — 85025 COMPLETE CBC W/AUTO DIFF WBC: CPT

## 2024-12-02 PROCEDURE — 93458 L HRT ARTERY/VENTRICLE ANGIO: CPT | Performed by: INTERNAL MEDICINE

## 2024-12-02 PROCEDURE — G0378 HOSPITAL OBSERVATION PER HR: HCPCS

## 2024-12-02 PROCEDURE — C1769 GUIDE WIRE: HCPCS | Performed by: INTERNAL MEDICINE

## 2024-12-02 PROCEDURE — 63600175 PHARM REV CODE 636 W HCPCS: Performed by: INTERNAL MEDICINE

## 2024-12-02 RX ORDER — METOPROLOL SUCCINATE 50 MG/1
50 TABLET, EXTENDED RELEASE ORAL DAILY
Qty: 90 TABLET | Refills: 3 | Status: SHIPPED | OUTPATIENT
Start: 2024-12-03 | End: 2025-12-03

## 2024-12-02 RX ORDER — LEVOTHYROXINE SODIUM 150 UG/1
150 TABLET ORAL
Qty: 30 TABLET | Refills: 11 | Status: SHIPPED | OUTPATIENT
Start: 2024-12-03 | End: 2025-12-03

## 2024-12-02 RX ORDER — FENTANYL CITRATE 50 UG/ML
INJECTION, SOLUTION INTRAMUSCULAR; INTRAVENOUS
Status: DISCONTINUED | OUTPATIENT
Start: 2024-12-02 | End: 2024-12-02 | Stop reason: HOSPADM

## 2024-12-02 RX ORDER — IODIXANOL 320 MG/ML
INJECTION, SOLUTION INTRAVASCULAR
Status: DISCONTINUED | OUTPATIENT
Start: 2024-12-02 | End: 2024-12-02 | Stop reason: HOSPADM

## 2024-12-02 RX ORDER — HEPARIN SODIUM 200 [USP'U]/100ML
INJECTION, SOLUTION INTRAVENOUS
Status: DISCONTINUED | OUTPATIENT
Start: 2024-12-02 | End: 2024-12-02 | Stop reason: HOSPADM

## 2024-12-02 RX ORDER — HEPARIN SODIUM 1000 [USP'U]/ML
INJECTION, SOLUTION INTRAVENOUS; SUBCUTANEOUS
Status: DISCONTINUED | OUTPATIENT
Start: 2024-12-02 | End: 2024-12-02 | Stop reason: HOSPADM

## 2024-12-02 RX ORDER — LIDOCAINE HYDROCHLORIDE 10 MG/ML
INJECTION, SOLUTION EPIDURAL; INFILTRATION; INTRACAUDAL; PERINEURAL
Status: DISCONTINUED | OUTPATIENT
Start: 2024-12-02 | End: 2024-12-02 | Stop reason: HOSPADM

## 2024-12-02 RX ORDER — NAPROXEN SODIUM 220 MG/1
81 TABLET, FILM COATED ORAL DAILY
Qty: 90 TABLET | Refills: 0 | Status: SHIPPED | OUTPATIENT
Start: 2024-12-03 | End: 2025-12-03

## 2024-12-02 RX ORDER — CLOPIDOGREL BISULFATE 75 MG/1
75 TABLET ORAL DAILY
Qty: 30 TABLET | Refills: 11 | OUTPATIENT
Start: 2024-12-02 | End: 2025-12-02

## 2024-12-02 RX ORDER — MIDAZOLAM HYDROCHLORIDE 1 MG/ML
INJECTION, SOLUTION INTRAMUSCULAR; INTRAVENOUS
Status: DISCONTINUED | OUTPATIENT
Start: 2024-12-02 | End: 2024-12-02 | Stop reason: HOSPADM

## 2024-12-02 RX ORDER — ATORVASTATIN CALCIUM 40 MG/1
40 TABLET, FILM COATED ORAL DAILY
Qty: 90 TABLET | Refills: 3 | Status: SHIPPED | OUTPATIENT
Start: 2024-12-03 | End: 2025-12-03

## 2024-12-02 RX ORDER — VERAPAMIL HYDROCHLORIDE 2.5 MG/ML
INJECTION, SOLUTION INTRAVENOUS
Status: DISCONTINUED | OUTPATIENT
Start: 2024-12-02 | End: 2024-12-02 | Stop reason: HOSPADM

## 2024-12-02 RX ORDER — PANTOPRAZOLE SODIUM 40 MG/1
40 TABLET, DELAYED RELEASE ORAL DAILY
Qty: 90 TABLET | Refills: 3 | Status: SHIPPED | OUTPATIENT
Start: 2024-12-03 | End: 2025-12-03

## 2024-12-02 RX ORDER — CLOPIDOGREL BISULFATE 75 MG/1
75 TABLET ORAL DAILY
Qty: 30 TABLET | Refills: 11 | Status: SHIPPED | OUTPATIENT
Start: 2024-12-02 | End: 2025-12-02

## 2024-12-02 RX ADMIN — METOPROLOL SUCCINATE 50 MG: 50 TABLET, EXTENDED RELEASE ORAL at 09:12

## 2024-12-02 RX ADMIN — PERFLUTREN 0.7 ML: 6.52 INJECTION, SUSPENSION INTRAVENOUS at 11:12

## 2024-12-02 RX ADMIN — PANTOPRAZOLE SODIUM 40 MG: 40 TABLET, DELAYED RELEASE ORAL at 09:12

## 2024-12-02 RX ADMIN — LEVOTHYROXINE SODIUM 150 MCG: 75 TABLET ORAL at 06:12

## 2024-12-02 RX ADMIN — ASPIRIN 81 MG CHEWABLE TABLET 81 MG: 81 TABLET CHEWABLE at 09:12

## 2024-12-02 NOTE — PLAN OF CARE
SOCIAL WORK DISCHARGE PLANNING ASSESSMENT    SW completed discharge planning assessment with pt. Pt was easily engaged and education on the role of  was provided. Pt stated she lives with her ex-fiance. Pt reported she has good support from friends. Pt reported she has no DME and is not current with  services. Pt drives herself to doctor appointments and pt's friend may be able to provide transportation home following discharge. Pt stated she may need a ride upon discharge though. No needs for community resources were reported. Pt was encouraged to call with any questions or concerns. Pt's verbalized understanding.     Future Appointments   Date Time Provider Department Center   1/22/2025  1:40 PM Juanita Tyson PA-C [facility]        Patient Active Problem List   Diagnosis    ACS (acute coronary syndrome)      12/02/24 1426   Discharge Assessment   Assessment Type Discharge Planning Assessment   Confirmed/corrected address, phone number and insurance Yes   Confirmed Demographics Correct on Facesheet   Source of Information patient   Does patient/caregiver understand observation status Yes   Communicated HARRY with patient/caregiver Yes   Reason For Admission ACS   People in Home significant other   Facility Arrived From: home   Do you expect to return to your current living situation? Yes   Do you have help at home or someone to help you manage your care at home? Yes   Who are your caregiver(s) and their phone number(s)? pt's friends   Prior to hospitilization cognitive status: Alert/Oriented   Current cognitive status: Alert/Oriented   Walking or Climbing Stairs Difficulty no   Dressing/Bathing Difficulty no   Home Accessibility wheelchair accessible   Home Layout Able to live on 1st floor   Equipment Currently Used at Home none   Readmission within 30 days? No   Patient currently being followed by outpatient case management? No   Do you currently have service(s) that help you  manage your care at home? No   Do you take prescription medications? Yes   Do you have prescription coverage? Yes   Coverage Medicaid   Do you have any problems affording any of your prescribed medications? No   Is the patient taking medications as prescribed? yes   Who is going to help you get home at discharge? pt's friends   How do you get to doctors appointments? car, drives self   Are you on dialysis? No   Do you take coumadin? No   Discharge Plan A Home   DME Needed Upon Discharge    (TBD)   Discharge Plan discussed with: Patient   Transition of Care Barriers None   Physical Activity   On average, how many days per week do you engage in moderate to strenuous exercise (like a brisk walk)? Pt Unable   On average, how many minutes do you engage in exercise at this level? Pt Unable   Financial Resource Strain   How hard is it for you to pay for the very basics like food, housing, medical care, and heating? Somewhat   Housing Stability   In the last 12 months, was there a time when you were not able to pay the mortgage or rent on time? N   At any time in the past 12 months, were you homeless or living in a shelter (including now)? N   Transportation Needs   Has the lack of transportation kept you from medical appointments, meetings, work or from getting things needed for daily living? No   Food Insecurity   Within the past 12 months, you worried that your food would run out before you got the money to buy more. Sometimes   Within the past 12 months, the food you bought just didn't last and you didn't have money to get more. Sometimes   Stress   Do you feel stress - tense, restless, nervous, or anxious, or unable to sleep at night because your mind is troubled all the time - these days? Only a littl   Social Isolation   How often do you feel lonely or isolated from those around you?  Never   Alcohol Use   Q1: How often do you have a drink containing alcohol? Pt Unable   Q2: How many drinks containing alcohol do you  have on a typical day when you are drinking? Pt Unable   Q3: How often do you have six or more drinks on one occasion? Pt Unable   Utilities   In the past 12 months has the electric, gas, oil, or water company threatened to shut off services in your home? No   Health Literacy   How often do you need to have someone help you when you read instructions, pamphlets, or other written material from your doctor or pharmacy? Never   OTHER   Name(s) of People in Home pt's ex-taiwo

## 2024-12-02 NOTE — PROGRESS NOTES
"[facility] Medicine Progress Note    Primary Team: hospitals Hospitalist Team B  Attending Physician: Rene Vela MD  Resident: Triston  Intern: David    Subjective:      Vitals stable overnight, patient reports feeling well this morning. No recurrence of chest pain or discomfort. She denies symptoms at this time.      Objective:     Last 24 Hour Vital Signs:  BP  Min: 116/66  Max: 145/66  Temp  Av.9 °F (36.6 °C)  Min: 97.6 °F (36.4 °C)  Max: 98.1 °F (36.7 °C)  Pulse  Av.9  Min: 75  Max: 145  Resp  Av.5  Min: 15  Max: 20  SpO2  Av.4 %  Min: 94 %  Max: 97 %  Height  Av' 4" (162.6 cm)  Min: 5' 4" (162.6 cm)  Max: 5' 4" (162.6 cm)  Weight  Av.2 kg (134 lb 14.7 oz)  Min: 61.2 kg (134 lb 14.7 oz)  Max: 61.2 kg (134 lb 14.7 oz)  I/O last 3 completed shifts:  In: 600 [P.O.:600]  Out: -     Physical Examination:  Constitutional: Awake, alert, no acute distress  Neurological: Alert and oriented, no focal deficits  Head/Neck: Full range of motion, no tenderness or stiffness  Cardiovascular: Regular rate and rhythm, no murmur  Pulmonary: Lungs clear, equal breath sounds  Abdominal: Soft, non tender, normal bowel sounds  Musculoskeletal: Full range of motion, no edema    Laboratory:  Laboratory Data Reviewed:   Pertinent Findings:  aPTT 90.3    Other Results:  EKG (my interpretation):   NSR rate 81    Radiology Data Reviewed:   Pertinent Findings:  CXR no acute abnormality    Echo :    Left Ventricle: The left ventricle is normal in size. Normal wall thickness. There is normal systolic function with a visually estimated ejection fraction of 55 - 60%. There is normal diastolic function.    Right Ventricle: Normal right ventricular cavity size. Systolic function is reduced.TAPSE is 1.43 cm. TDI S' is 8.0 cm/s.    Mitral Valve: There is prolapse of the posterior mitral leaflet. There is mild regurgitation with a posterolateral eccentriccally directed jet.    IVC/SVC: Normal venous pressure at 3 " mmHg.    Current Medications:     Infusions:   heparin (porcine) in D5W  0-40 Units/kg/hr (Adjusted) Intravenous Continuous   Stopped at 12/02/24 0907    heparin (porcine)    Continuous  mL/hr at 12/02/24 1052 1,500 Units/hr at 12/02/24 1052        Scheduled:   aspirin  81 mg Oral Daily    atorvastatin  40 mg Oral Daily    levothyroxine  150 mcg Oral Before breakfast    metoprolol succinate  50 mg Oral Daily    pantoprazole  40 mg Oral Daily        PRN:    Current Facility-Administered Medications:     diphenhydrAMINE, 25 mg, Oral, Nightly PRN    fentaNYL, , , PRN    heparin (porcine), , , PRN    heparin (PORCINE), 60 Units/kg (Adjusted), Intravenous, PRN    heparin (PORCINE), 30 Units/kg (Adjusted), Intravenous, PRN    heparin (porcine), , , Continuous PRN    iodixanoL, , , PRN    LIDOcaine (PF) 10 mg/ml (1%), , , PRN    midazolam, , , PRN    nitroGLYCERIN in D5W 200 mcg/mL, , , PRN    nitroGLYCERIN, 0.4 mg, Sublingual, Q5 Min PRN    sodium chloride 0.9%, 10 mL, Intravenous, PRN    verapamiL, , , PRN      Lines and Day Number of Therapy:  PIVx2    Assessment:     Amarilys Aguilar is a 54 y.o. female with history of GERD, hypothyroidism 2/2 Hashimoto's, psoriasis, and remote hx of H. pylori who presented to [facility] on 11/30/2024 with a primary complaint of substernal chest pain that began around 2330 while showering and was admitted to [facility] for concern for ACS after elevation of repeat troponin. Planning for [facility] today and treatment for NSTEMI.      NSTEMI  Patient presented with complaint of intermittent substernal chest pressure that began around 2330 with associated left arm and jaw pain that has since subsided. Initial troponin negative but repeat was elevated at 0.040. Initial EKG was mostly reassuring. S/p ASA load in ED. She was trialed on NTG in the ED but states that it made her SOB.  Plan:  - troponin peaked at 0.073, no EKG changes  - echo EF 55-60%, some  reduced RV systolic function, mild mitral regurg  - Monitor on Tele  - cardiology consulted, performing LHC today  - ASA and plavix loaded, started on heparin drip     Gastroesophageal Reflux Disease  Remote Hx of H. Pylori Infection  Patient endorses long-standing hx of uncontrolled GERD as well as remote hx of H. pylori infection. She has trialed TUMS for symptom control but states that it is ineffective.   Plan:  - Trial Pantoprazole 40 mg daily, can increase to bid if symptoms not fully controlled  - Continue to monitor symptoms     Hypothyroidism Secondary to Hashimoto's Thyroiditis  Patient is currently prescribed Levothyroxine 175 mcg qam. TSH and free T4 obtained in the ED were 0.035 and 1.57, respectively.   Plan:  - Recommend follow up with PCP and endocrine upon discharge  - home dose of levothyroxine decreased from 175 to 150 due to elevated free T4 and concern for demand ischemia from hyperthyroidism     Psoriasis  Patient endorses hx of psoriasis but is not currently taking any medications for treatment.  Plan:  - No acute interventions at this time  - Refer back to PCP upon discharge    Anxiety  - outpatient referral to psych for anxiety      Healthcare Maintenance  Plan:  - Plan to further discuss importance of staying up to date with immunizations prior to discharge  - Plan to refer to OB/GYN to establish care and for Pap smear prior to discharge  - Patient has ColoGuard kit at home but has not completed specimen collection, discuss importance  - Plan to refer for screening mammogram prior to discharge     Code Status: Full Code  VTE Ppx: Heparin drip  Diet: NPO currently     Disposition: Admit to [facility] for NSTEMI     Estimated LOS: 1-2 days     Jayde Goldstein MD  [facility] Internal Medicine/Pediatrics HO-2    [facility] Medicine Hospitalist Pager numbers:   [facility] Hospitalist Medicine Team A (Dane/Lester): [phone]  [facility] Hospitalist Medicine Team B (Gigi/Latha):  [phone]

## 2024-12-02 NOTE — PLAN OF CARE
2 mL of air removed from radial vasc band.  No hematoma or bleeding noted.  +2 nilo radial pulses palpated. Skin normal in color, warm to touch, < 3 sec cap refill.   Will continue to monitor pt.

## 2024-12-02 NOTE — H&P (VIEW-ONLY)
[facility] - Telemetry  Cardiology  Consult Note    Patient Name: Amarilys Aguilar  MRN: [id number]  Admission Date: 11/30/2024  Hospital Length of Stay: 0 days  Code Status: Full Code   Attending Provider: Rene Vela MD   Consulting Provider: Sanjiv Ashley NP  Primary Care Physician: Lana Horowitz MD  Principal Problem:ACS (acute coronary syndrome)    Patient information was obtained from ER records.     Inpatient consult to Interventional Cardiology  Consult performed by: Sanjiv Ashley NP  Consult ordered by: Jayde Goldstein MD        Subjective:     See consult note by Dr Pak      HPI:   No notes on file    No new subjective & objective note has been filed under this hospital service since the last note was generated.    Assessment and Plan:     No notes have been filed under this hospital service.  Service: Cardiology      VTE Risk Mitigation (From admission, onward)           Ordered     heparin 25,000 units in dextrose 5% (100 units/ml) IV bolus from bag LOW INTENSITY nomogram - [facility]  As needed (PRN)        Question:  Heparin Infusion Adjustment (DO NOT MODIFY ANSWER)  Answer:  [email or web address]\epic\Images\Pharmacy\HeparinInfusions\heparin LOW INTENSITY nomogram for [facility] [id number].pdf    12/01/24 1101     heparin 25,000 units in dextrose 5% (100 units/ml) IV bolus from bag LOW INTENSITY nomogram - [facility]  As needed (PRN)        Question:  Heparin Infusion Adjustment (DO NOT MODIFY ANSWER)  Answer:  [email or web address]\epic\Images\Pharmacy\HeparinInfusions\heparin LOW INTENSITY nomogram for [facility] [id number].pdf    12/01/24 1101     heparin 25,000 units in dextrose 5% 250 mL (100 units/mL) infusion LOW INTENSITY nomogram - [facility]  Continuous        Question:  Begin at (units/kg/hr)  Answer:  12    12/01/24 1101     IP VTE HIGH RISK PATIENT  Once         12/01/24 0349     Place sequential compression device  Until discontinued         12/01/24 0349                    Thank you for your consult. I will  follow-up with patient. Please contact us if you have any additional questions.    Sanjiv Ashley NP  Cardiology   [place]

## 2024-12-02 NOTE — DISCHARGE INSTRUCTIONS
"Your symptoms and lab work was concerning for a mild "heart attack" or myocardial infarction. Cardiology recommends that you take Aspirin and Plavix daily for 1 year even though your heart cath didn't show any plaque buildup. Follow up with cardiology clinic in 1 week.     Understanding Transradial Cardiac Catheterization    Cardiac catheterization (cardiac cath) is a common, non-surgical procedure. During the procedure, your doctor will insert a long, thin tube (catheter) into an artery and move it up into your heart. Transradial means the catheter is inserted into an artery in the wrist (the radial artery). This procedure can be used to diagnose and treat certain heart problems.  Why do I need a transradial cardiac cath?  You may need a cardiac cath if signs indicate a problem with your heart. These may include:  Symptoms of chest pain, tightness, or heaviness (known as angina). This is a common symptom of blocked heart arteries, known as coronary artery disease.  Symptoms of weakness, dizziness, trouble breathing, or swollen legs or feet. These may be symptoms of a problem with a heart valve or the heart muscle.  Other test results show heart problems. Tests may include stress tests, heart scans, and echocardiography.  During a cardiac cath, your doctor can see the condition of the coronary arteries and heart valves. He or she can also check how well the heart pumps and the flow of blood through the heart. Your doctor can also measure pressures and take blood samples. And, if needed, he or she can open blocked arteries. This can help reduce symptoms of angina.  Cardiac cath is often done using a catheter inserted into an artery in the groin. During transradial cardiac cath, the catheter is inserted into an artery in the wrist. This can mean less bleeding and a faster recovery. Some people may have blockages in the groin arteries as well as in the heart arteries, making it difficult to reach the heart. The " transradial approach can be used to get around this problem.   What happens during a transradial cardiac cath?  The procedure is done in the hospital or a surgery center. First, an IV line is put in your arm or hand to deliver fluids and medicines. You will likely be given medicine to relax you and make you drowsy. When the procedure begins:  You lie on an X-ray table.  The skin over the insertion site in your wrist is numbed.  The doctor makes a tiny puncture or incision into the artery in the wrist. He or she then inserts a catheter and threads it through the blood vessel into your heart.    The doctor may inject a contrast fluid through the catheter into the arteries. This fluid makes the arteries show up better on X-rays.  Tests may be done to check the condition of your heart and arteries. If needed, the doctor can clear blockages in the arteries or do other repairs.  When the doctor is finished, he or she will remove the catheter and put direct pressure on the site to prevent bleeding.  You will stay for a time to recover, and then go home.  What are the risks of transradial cardiac cath?  These include:  Bleeding, bruising, infection, or blood clots  Damage to the radial artery that may cause injury to the hand  Allergic reaction to the contrast fluid  Abnormal heartbeat (arrhythmia)  Damage to blood vessels or tissues  Kidney damage or failure  The need for emergency heart surgery  Heart attack, stroke, or death  Date Last Reviewed: 5/1/2016  © 5358-7131 [employer]. [address] [place] [address]. All rights reserved. This information is not intended as a substitute for professional medical care. Always follow your healthcare professional's instructions.Discharge Instructions:    Do not drive a car, operate heavy equipment, care for a young child, etc for the next 12-24 hours.   Avoid drinking alcohol for 24 hours.  Do not make any important decisions for 24 hours.    Drink fluids  to keep hydrated. Resume your usual diet as tolerated.     Rest for today then activity as tolerated.   Do not lift anything over 5 pounds for the first 3 days after procedure.    Remove dressing tomorrow then may shower with warm soapy water. Do not scrub site. Pat dry.   May apply bandaid for 2 days.  No tub baths.  Do not submerge wound in water for 3 days.     Call MD for any unrelieved pain, excessive nausea or vomiting, redness around site, bleeding, or pus or foul smelling drainage, or any other questions or concerns.    Go to the ER for any difficulty breathing or chest pain.      If site swells or bleeds, hold direct pressure to area for 10 full minutes.   If site continues to bleed, continue to hold pressure to site and have someone bring you to the ER.      Follow any additional instructions given to you by MD.      Call MD to schedule a follow up appointment, or follow up as instructed.

## 2024-12-02 NOTE — CONSULTS
[facility] - Telemetry  Cardiology  Consult Note    Patient Name: Amarilys Aguilar  MRN: [id number]  Admission Date: 11/30/2024  Hospital Length of Stay: 0 days  Code Status: Full Code   Attending Provider: Rene Vela MD   Consulting Provider: Sanjiv Ashley NP  Primary Care Physician: Lana Horowitz MD  Principal Problem:ACS (acute coronary syndrome)    Patient information was obtained from ER records.     Inpatient consult to Interventional Cardiology  Consult performed by: Sanjiv Ashley NP  Consult ordered by: Jayde Goldstein MD        Subjective:     See consult note by Dr Pak      HPI:   No notes on file    No new subjective & objective note has been filed under this hospital service since the last note was generated.    Assessment and Plan:     No notes have been filed under this hospital service.  Service: Cardiology      VTE Risk Mitigation (From admission, onward)           Ordered     heparin 25,000 units in dextrose 5% (100 units/ml) IV bolus from bag LOW INTENSITY nomogram - [facility]  As needed (PRN)        Question:  Heparin Infusion Adjustment (DO NOT MODIFY ANSWER)  Answer:  \\[email or web address]\epic\Images\Pharmacy\HeparinInfusions\heparin LOW INTENSITY nomogram for [facility] [id number].pdf    12/01/24 1101     heparin 25,000 units in dextrose 5% (100 units/ml) IV bolus from bag LOW INTENSITY nomogram - [facility]  As needed (PRN)        Question:  Heparin Infusion Adjustment (DO NOT MODIFY ANSWER)  Answer:  [email or web address]\epic\Images\Pharmacy\HeparinInfusions\heparin LOW INTENSITY nomogram for [facility] [id number].pdf    12/01/24 1101     heparin 25,000 units in dextrose 5% 250 mL (100 units/mL) infusion LOW INTENSITY nomogram - [facility]  Continuous        Question:  Begin at (units/kg/hr)  Answer:  12    12/01/24 1101     IP VTE HIGH RISK PATIENT  Once         12/01/24 0349     Place sequential compression device  Until discontinued         12/01/24 0349                    Thank you for your consult. I will  follow-up with patient. Please contact us if you have any additional questions.    Sanjiv Ashley NP  Cardiology   [place] [facility]

## 2024-12-02 NOTE — PLAN OF CARE
Problem: Adult Inpatient Plan of Care  Goal: Plan of Care Review  Outcome: Progressing   Chart and care plan reviewed   Referring provider: Minda Olivier, OD  Primary eye provider: Jean Pierre To MD  Mr. Martinez gave us verbal permission to discuss their medical condition in the presence of the following individual(s) in the room: Vale (spouse)    SURGEON: Jean Pierre To MD  SURGERY DATE:  04/03/2019    POST-OP VISIT:     S/p Cataract extraction Right Eye 1 day.  Patient doing well since surgery.  Compliant with drops.         POST-OP EYE MEDS: Prednisolone QID, Ketorolac OD BID    Base Eye Exam     Visual Acuity (Snellen - Linear)       Right Left    Dist sc 20/70 -2     Dist ph sc No Improvement           Tonometry (Applanation, 9:01 AM)       Right Left    Pressure 14             Slit Lamp and Fundus Exam     Slit Lamp Exam       Right Left    Lids/Lashes Normal     Conjunctiva/Sclera Clear     Cornea Clear     Anterior Chamber 1+ Cell     Iris Round and regular     Lens Centered posterior chamber intraocular lens                   ASSESSMENT/PLAN:  1. S/p PCIOL, Right Eye, 1 day. Doing well. Prednisolone and ketorolac as prescribed. He can keep the one week postoperative check with me and he will need another injection coming up soon. I will also like to get him back to see Dr. Olivier postoperatively and he will need an updated Rx once healed.    Written instructions detailing restrictions, precautions, and any drops were printed for the patient with their after visit summary. All questions were answered to the patient's satisfaction. They were instructed any further questions once to please contact us at [phone].    Keep scheduled appt, OR IMMEDIATELY PRN AS INSTRUCTED.

## 2024-12-02 NOTE — PLAN OF CARE
1727: Discharge orders noted. AVS prepared with medication details, clinical reference list and 24/7 [facility] Nurse On Call number attached. AVS review to follow.     1735: Cued in to confirm that patient received her medications from pharmacy bedside delivery. Patient reported that she received new dose of synthroid and that she can not take the generic. [facility] outpatient pharmacy closed for the day. Bedside nurse Maya at bedside to discuss plavix prescription being sent to outside pharmacy. Patient does not want to take plavix. Maya will let Dr Donohue know and I requested that she have Dr Donohue come to bedside to discuss synthroid concerns with patient. Awaiting updates to meds and will correct AVS.

## 2024-12-02 NOTE — PLAN OF CARE
Remaining air removed from right radial vasc band. Gauze and tegaderm dressing applied c.d.i.   No drainage or shadowing noted. No bleeding or hematoma noted around site. +2 nilo radial pulses palpated. Skin normal in color, warm to touch, < 3 sec cap refill.   Pt tolerated well.  Will continue to monitor pt.

## 2024-12-02 NOTE — INTERVAL H&P NOTE
The patient has been examined and the H&P has been reviewed:    I concur with the findings and no changes have occurred since H&P was written.    Anesthesia/Surgery risks, benefits and alternative options discussed and understood by patient/family.          Active Hospital Problems    Diagnosis  POA    *ACS (acute coronary syndrome) [I24.9]  Yes      Resolved Hospital Problems   No resolved problems to display.

## 2024-12-02 NOTE — NURSING
0530 pt ambulated to bathroom to perform chlorhex bath. HR elevated to 140.  Nurse entered room to assist pt

## 2024-12-02 NOTE — DISCHARGE SUMMARY
[facility] Medicine Discharge Summary    Primary Team: [facility] Hospitalist Team B  Attending Physician: Dr. Yu  Resident: Triston  Intern: David    Date of Admit: 11/30/2024  Date of Discharge: 12/2/2024    Discharge to: home  Condition: improved    Discharge Diagnoses     Patient Active Problem List   Diagnosis    ACS (acute coronary syndrome)    Elevated troponin    NSTEMI (non-ST elevated myocardial infarction)       Consultants and Procedures     Consultants:  Cardiology    Procedures:   [facility]    Imaging:      [facility] 12/2:  Findings:      Left dominant coronary circulation   Normal coronary vessels without any atherosclerotic coronary artery disease   LVEDP of 6 mm Hg.  No gradient across the aortic valve.  LV-gram not performed as a part of this study.       Recommendations:      Continue dual antiplatelet therapy for 1 year considering presentation of non ST elevation MI   Risk factor modification  Follow up in Cardiology Clinic in 1 week    Echo 12/2:       Left Ventricle: The left ventricle is normal in size. Normal wall thickness. There is normal systolic function with a visually estimated ejection fraction of 55 - 60%. There is normal diastolic function.    Right Ventricle: Normal right ventricular cavity size. Systolic function is reduced.TAPSE is 1.43 cm. TDI S' is 8.0 cm/s.    Mitral Valve: There is prolapse of the posterior mitral leaflet. There is mild regurgitation with a posterolateral eccentriccally directed jet.    IVC/SVC: Normal venous pressure at 3 mmHg.    Brief History of Present Illness & Summary of Hospital Course      Amarilys Aguilar is a 54 y.o.female with a PMHx of GERD, hypothyroidism 2/2 Hashimoto's, psoriasis, and remote hx of H. pylori who presented on 11/30/2024 for substernal chest pain that began around 2330 while showering.     The patient was in their usual state of health until approximately 2330 while showering before bed when she began experiencing chest tightness  that transitioned into intense substernal chest pressure after approximately 1 minute. She also endorses experiencing nausea as well as left arm and left jaw pain for approximately 3-5 minutes that has since resolved. Her chest discomfort has been intermittent since arriving to the ED. She denies experiencing anything like this previously. She denies experiencing any diaphoresis, palpitations, SOB, headache, dizziness, abdominal pain, fever, chills, or vomiting.     Of note, she states that her now ex-fiance ended their relationship this past Wednesday, one day before Thanksgiving and two days before her birthday. She believes that the stress from this could be contributing to her presentation.    On the day of discharge, patient was afebrile with stable vital signs and will be discharged in stable condition to home or self care with close follow up.     For the full HPI please refer to the History & Physical from this admission.    Hospital Course By Problem with Pertinent Findings     NSTEMI  Patient presented with complaint of intermittent substernal chest pressure that began around 2330 with associated left arm and jaw pain that has since subsided. Initial troponin negative but repeat was elevated at 0.040. Initial EKG was mostly reassuring. S/p ASA load in ED. She was trialed on NTG in the ED but states that it made her SOB.  Plan:  - troponin peaked at 0.073, no EKG changes  - echo EF 55-60%, some reduced RV systolic function, mild mitral regurg  - cardiology consulted due to elevated troponins, recommended starting on heparin drip and performed LHC which was negative   - patient was aspirin and plavix loaded and cardiology recommended continuing DAPT for 1 year and following up in cardiology clinic in 1 week     Gastroesophageal Reflux Disease  Remote Hx of H. Pylori Infection  Patient endorses long-standing hx of uncontrolled GERD as well as remote hx of H. pylori infection. She has trialed TUMS for symptom  control but states that it is ineffective.   Plan:  - Trial Pantoprazole 40 mg daily, can increase to bid if symptoms not fully controlled  - referral to GI outpatient sent given patient's intermittent history of abdominal pain and hx of H pylori which she states she didn't fully complete abx for     Hypothyroidism Secondary to Hashimoto's Thyroiditis  Patient is currently prescribed Levothyroxine 175 mcg qam. TSH and free T4 obtained in the ED were 0.035 and 1.57, respectively.   Plan:  - Recommend follow up with PCP and endocrine upon discharge  - home dose of levothyroxine decreased from 175 to 150 due to elevated free T4 and concern for demand ischemia from hyperthyroidism     Psoriasis  Patient endorses hx of psoriasis but is not currently taking any medications for treatment.  Plan:  - No acute interventions at this time  - Refer back to PCP upon discharge     Anxiety  - outpatient referral to psych for anxiety   - recommended counseling and discussed coping mechanisms    Discharge Medications        Medication List        START taking these medications      atorvastatin 40 MG tablet  Commonly known as: LIPITOR  Take 1 tablet (40 mg total) by mouth once daily.     TRISTON CHEWABLE ASPIRIN 81 mg Chew  Generic drug: aspirin  Take 1 tablet (81 mg total) by mouth once daily.     clopidogreL 75 mg tablet  Commonly known as: PLAVIX  Take 1 tablet (75 mg total) by mouth once daily.     metoprolol succinate 50 MG 24 hr tablet  Commonly known as: TOPROL-XL  Take 1 tablet (50 mg total) by mouth once daily.            CHANGE how you take these medications      levothyroxine 150 MCG tablet  Commonly known as: SYNTHROID  Take 1 tablet (150 mcg total) by mouth before breakfast.  What changed:   medication strength  how much to take  when to take this     pantoprazole 40 MG tablet  Commonly known as: PROTONIX  Take 1 tablet (40 mg total) by mouth once daily.  What changed:   medication strength  how much to take                Where to Get Your Medications        These medications were sent to [facility]  [address], [place] [address]      Hours: Mon-Fri, 8a-5:30p Phone: [phone]   atorvastatin 40 MG tablet  TRISTON CHEWABLE ASPIRIN 81 mg Chew  clopidogreL 75 mg tablet  levothyroxine 150 MCG tablet  metoprolol succinate 50 MG 24 hr tablet  pantoprazole 40 MG tablet          Discharge Information:   Diet:  regular    Physical Activity:  As tolerated             Instructions:  1. Take all medications as prescribed  2. Keep all follow-up appointments  3. Return to the hospital or call your primary care physicians if any worsening symptoms such as fever, chest pain, shortness of breath, return of symptoms, or any other concerns.    Follow-Up Appointments:  PCP  GI  Psychiatry  Cardiology    Jayde Goldstein MD  [employer] Med-Peds PGY-2

## 2024-12-02 NOTE — PLAN OF CARE
Patient transferred to floor on tele monitor.  VSS. No c/o pain. Patient attached to tele monitor upon arrival in room 476.  Right radial gauze/tegaderm CDI. No bleeding or hematoma noted. Site reviewed with charge, RN at bedside. Maya inavailable.   All questions answered.

## 2024-12-02 NOTE — PLAN OF CARE
Patient transferred to recovery cath lab slot 3 via stretcher with side rails up x2 .  Pt AAO X4 and able to follow commands. Pt is stable when connecting to cardiac monitors.  VSS. Right radial vasc band in place with 9 ml of air in band c.d.i. no bleeding or hematoma noted. +2 nilo radial pulses palpated. +2 nilo pedal pulses.Skin normal in color and warm to touch, <3 sec cap refill.  Fall risk precautions given and patient acknowledges.  AIDET completed to pt.  Will continue to monitor patient.

## 2024-12-03 LAB
OHS QRS DURATION: 76 MS
OHS QTC CALCULATION: 436 MS

## 2024-12-03 NOTE — PLAN OF CARE
12/02/24 1846   AVS Confirmation   Discharge instructions and AVS provided to and reviewed with patient and/or significant other. Yes         AVS printed and handed to patient by bedside nurse. VN reviewed discharge instructions with patient using teachback method.  Allowed time for questions, all questions answered.  Patient verbalized complete understanding of discharge instructions and voices no concerns. Discharge instructions complete. Bedside delivery complete. Bedside nurse notified.

## 2024-12-05 ENCOUNTER — OFFICE VISIT (OUTPATIENT)
Dept: GASTROENTEROLOGY | Facility: CLINIC | Age: 54
End: 2024-12-05
Payer: MEDICAID

## 2024-12-05 VITALS
HEIGHT: 64 IN | SYSTOLIC BLOOD PRESSURE: 131 MMHG | HEART RATE: 127 BPM | BODY MASS INDEX: 22.86 KG/M2 | DIASTOLIC BLOOD PRESSURE: 85 MMHG | WEIGHT: 133.94 LBS

## 2024-12-05 DIAGNOSIS — Z86.19 HISTORY OF HELICOBACTER PYLORI INFECTION: ICD-10-CM

## 2024-12-05 DIAGNOSIS — K59.09 CHRONIC CONSTIPATION: ICD-10-CM

## 2024-12-05 DIAGNOSIS — R10.12 LEFT UPPER QUADRANT ABDOMINAL PAIN: ICD-10-CM

## 2024-12-05 DIAGNOSIS — K21.9 GASTROESOPHAGEAL REFLUX DISEASE, UNSPECIFIED WHETHER ESOPHAGITIS PRESENT: Primary | ICD-10-CM

## 2024-12-05 PROBLEM — E06.3 HYPOTHYROIDISM DUE TO HASHIMOTO'S THYROIDITIS: Status: ACTIVE | Noted: 2019-01-03

## 2024-12-05 PROCEDURE — 1159F MED LIST DOCD IN RCRD: CPT | Mod: CPTII,,, | Performed by: NURSE PRACTITIONER

## 2024-12-05 PROCEDURE — 99214 OFFICE O/P EST MOD 30 MIN: CPT | Mod: PBBFAC,PN | Performed by: NURSE PRACTITIONER

## 2024-12-05 PROCEDURE — 99999 PR PBB SHADOW E&M-EST. PATIENT-LVL IV: CPT | Mod: PBBFAC,,, | Performed by: NURSE PRACTITIONER

## 2024-12-05 PROCEDURE — 99204 OFFICE O/P NEW MOD 45 MIN: CPT | Mod: S$PBB,,, | Performed by: NURSE PRACTITIONER

## 2024-12-05 PROCEDURE — 3079F DIAST BP 80-89 MM HG: CPT | Mod: CPTII,,, | Performed by: NURSE PRACTITIONER

## 2024-12-05 PROCEDURE — 3075F SYST BP GE 130 - 139MM HG: CPT | Mod: CPTII,,, | Performed by: NURSE PRACTITIONER

## 2024-12-05 PROCEDURE — 1160F RVW MEDS BY RX/DR IN RCRD: CPT | Mod: CPTII,,, | Performed by: NURSE PRACTITIONER

## 2024-12-05 PROCEDURE — 3008F BODY MASS INDEX DOCD: CPT | Mod: CPTII,,, | Performed by: NURSE PRACTITIONER

## 2024-12-05 NOTE — PROGRESS NOTES
Subjective:       Patient ID: Amarilys Aguilar is a 54 y.o. female.    Chief Complaint: Abdominal Pain    55 y/o female with thyroid disease presents to clinic for hospital follow up with c/o reflux and abdominal pain. Patient recently admitted to [facility] with c/o chest pain and was found to have NSTEMI. She is not taking taking aspirin, atorvastatin, metoprolol or Plavix. Feels symptoms were triggered by stress as her now ex-finance ended the relationship just prior to Thanksgiving and medications are not needed as angiogram revealed no blockages. Has follow up with cardiology NP next week. Reports long history of GERD and chronic constipation. Last seen by GI over 20 years ago. Diagnosed with H. Pylori with gastric biopsy and admits she did not complete antibiotic therapy. Self-diagnosed gluten sensitivity and has decreased gluten intake which she feels helped with chronic inflammation. Reports frequent heartburn, belching and LUQ pain after eating. Only able to tolerate spinach, sauerkraut, and some fruit. Symptoms relieved with pantoprazole. Reports long history chronic constipation. Has BM 2-3x/week. Has never had colonoscopy.          Past Medical History:   Diagnosis Date    GERD (gastroesophageal reflux disease)     Thyroid disease        Past Surgical History:   Procedure Laterality Date    LEFT HEART CATHETERIZATION Left 12/2/2024    Procedure: Left heart cath;  Surgeon: Dakota Pak MD;  Location: [facility] CATH LAB/EP;  Service: Cardiology;  Laterality: Left;       No family history on file.    Social History     Socioeconomic History    Marital status:    Tobacco Use    Smoking status: Never   Substance and Sexual Activity    Alcohol use: Yes     Comment: occ    Drug use: No     Social Drivers of Health     Financial Resource Strain: Medium Risk (12/2/2024)    Overall Financial Resource Strain (CARDIA)     Difficulty of Paying Living Expenses: Somewhat hard   Food Insecurity: Food  "Insecurity Present (12/2/2024)    Hunger Vital Sign     Worried About Running Out of Food in the Last Year: Sometimes true     Ran Out of Food in the Last Year: Sometimes true   Transportation Needs: No Transportation Needs (12/2/2024)    TRANSPORTATION NEEDS     Transportation : No   Physical Activity: Patient Unable To Answer (12/2/2024)    Exercise Vital Sign     Days of Exercise per Week: Patient unable to answer     Minutes of Exercise per Session: Patient unable to answer   Stress: No Stress Concern Present (12/2/2024)    Citizen of [place] of Occupational Health - Occupational Stress Questionnaire     Feeling of Stress : Only a little   Recent Concern: Stress - Stress Concern Present (12/1/2024)    Citizen of [place] of Occupational Health - Occupational Stress Questionnaire     Feeling of Stress : Very much   Housing Stability: Low Risk  (12/2/2024)    Housing Stability Vital Sign     Unable to Pay for Housing in the Last Year: No     Homeless in the Last Year: No       Review of Systems   Constitutional:  Negative for appetite change and unexpected weight change.   HENT:  Negative for trouble swallowing.    Respiratory:  Negative for shortness of breath.    Cardiovascular:  Negative for chest pain.   Gastrointestinal:  Positive for abdominal pain and constipation.   Hematological:  Negative for adenopathy. Does not bruise/bleed easily.   Psychiatric/Behavioral:  Negative for dysphoric mood.          Objective:     Vitals:    12/05/24 1040   BP: 131/85   BP Location: Left arm   Patient Position: Sitting   Pulse: (!) 127   Weight: 60.7 kg (133 lb 14.9 oz)   Height: 5' 4" (1.626 m)          Physical Exam  Constitutional:       General: She is not in acute distress.     Appearance: Normal appearance.   HENT:      Head: Normocephalic.   Eyes:      Conjunctiva/sclera: Conjunctivae normal.   Pulmonary:      Effort: Pulmonary effort is normal. No respiratory distress.   Musculoskeletal:         General: Normal range " of motion.      Cervical back: Normal range of motion.   Skin:     General: Skin is warm and dry.   Neurological:      Mental Status: She is alert and oriented to person, place, and time.   Psychiatric:         Mood and Affect: Mood normal.         Behavior: Behavior normal.               Assessment:         ICD-10-CM ICD-9-CM   1. Gastroesophageal reflux disease, unspecified whether esophagitis present  K21.9 530.81   2. Left upper quadrant abdominal pain  R10.12 789.02   3. History of Helicobacter pylori infection  Z86.19 V12.09   4. Chronic constipation  K59.09 564.00       Plan:       Gastroesophageal reflux disease, unspecified whether esophagitis present  -     Hold PPI until after HP stool test completed  - Discussed elimination of dietary triggers (fatty foods, caffeine, chocolate, spicy foods, food with high fat content, carbonated beverages, and peppermint.  - Raise the head of your bed by 6 to 8 inches - You can do this by putting blocks of wood or rubber under 2 legs of the bed or a foam wedge under the mattress.  - Avoid late meals - Lying down with a full stomach can make reflux worse. Try to plan meals for at least 2 to 3 hours before bedtime.  - Avoid tight clothing - Some people feel better if they wear comfortable clothing that does not squeeze the stomach area.     Left upper quadrant abdominal pain  -     Ambulatory referral/consult to Gastroenterology  -     H. pylori antigen, stool; Future; Expected date: 12/05/2024  -     X-Ray Abdomen Flat And Erect; Future; Expected date: 12/05/2024    History of Helicobacter pylori infection  -     H. pylori antigen, stool; Future; Expected date: 12/05/2024    Chronic constipation  -     X-Ray Abdomen Flat And Erect; Future; Expected date: 12/05/2024      Follow up if symptoms worsen or fail to improve.     Patient's Medications   New Prescriptions    No medications on file   Previous Medications    ASPIRIN 81 MG CHEW    Take 1 tablet (81 mg total) by mouth  once daily.    ATORVASTATIN (LIPITOR) 40 MG TABLET    Take 1 tablet (40 mg total) by mouth once daily.    CLOPIDOGREL (PLAVIX) 75 MG TABLET    Take 1 tablet (75 mg total) by mouth once daily.    LEVOTHYROXINE (SYNTHROID) 150 MCG TABLET    Take 1 tablet (150 mcg total) by mouth before breakfast.    METOPROLOL SUCCINATE (TOPROL-XL) 50 MG 24 HR TABLET    Take 1 tablet (50 mg total) by mouth once daily.    PANTOPRAZOLE (PROTONIX) 40 MG TABLET    Take 1 tablet (40 mg total) by mouth once daily.   Modified Medications    No medications on file   Discontinued Medications    No medications on file

## 2024-12-12 ENCOUNTER — OFFICE VISIT (OUTPATIENT)
Dept: CARDIOLOGY | Facility: CLINIC | Age: 54
End: 2024-12-12
Payer: MEDICAID

## 2024-12-12 VITALS
OXYGEN SATURATION: 97 % | WEIGHT: 134.25 LBS | DIASTOLIC BLOOD PRESSURE: 72 MMHG | HEIGHT: 64 IN | HEART RATE: 113 BPM | SYSTOLIC BLOOD PRESSURE: 106 MMHG | BODY MASS INDEX: 22.92 KG/M2

## 2024-12-12 DIAGNOSIS — E06.3 HYPOTHYROIDISM DUE TO HASHIMOTO'S THYROIDITIS: ICD-10-CM

## 2024-12-12 DIAGNOSIS — I21.4 NSTEMI (NON-ST ELEVATED MYOCARDIAL INFARCTION): Primary | ICD-10-CM

## 2024-12-12 DIAGNOSIS — I24.9 ACS (ACUTE CORONARY SYNDROME): ICD-10-CM

## 2024-12-12 PROCEDURE — 99999 PR PBB SHADOW E&M-EST. PATIENT-LVL III: CPT | Mod: PBBFAC,,,

## 2024-12-12 PROCEDURE — 3008F BODY MASS INDEX DOCD: CPT | Mod: CPTII,,,

## 2024-12-12 PROCEDURE — 1159F MED LIST DOCD IN RCRD: CPT | Mod: CPTII,,,

## 2024-12-12 PROCEDURE — 3078F DIAST BP <80 MM HG: CPT | Mod: CPTII,,,

## 2024-12-12 PROCEDURE — 99213 OFFICE O/P EST LOW 20 MIN: CPT | Mod: PBBFAC,PN

## 2024-12-12 PROCEDURE — 99203 OFFICE O/P NEW LOW 30 MIN: CPT | Mod: S$PBB,,,

## 2024-12-12 PROCEDURE — 1160F RVW MEDS BY RX/DR IN RCRD: CPT | Mod: CPTII,,,

## 2024-12-12 PROCEDURE — 3074F SYST BP LT 130 MM HG: CPT | Mod: CPTII,,,

## 2024-12-12 NOTE — PROGRESS NOTES
"Subjective:    Patient ID:  Amarilys Aguilar is a 54 y.o. female who presents for evaluation of No chief complaint on file.      PCP: Lana Horowitz MD     Referring Provider: Jayde Goldstein MD     HPI: Patient is a 55 yo F w/PMH of  NSTEMI ( normal coronaries 12/2/24), reflux disease, Hashimoto thyroiditis/hypothyroidism, psoriasis, past history of H pylori infection who presents today to establish care and hospital follow up. She presented to ED w c/o substernal chest pain and heaviness radiating to the left arm and jaw. Admitted 11/30/24-12/2/24 for non ST elevation MI w troponin of 0.07, LDL of 112, EKG shows inferior ST/T-wave changes that are slightly worsened compared to previous EKGs.  She is S/P cardiac angiogram/ LHC by Dr. Pak on 12/2/24: Left dominant coronary circulation,Normal coronary vessels without any atherosclerotic coronary artery disease. She was discharged home on medical; therapy:  ASA 81 mg, clopidogrel 75 mg, metoprolol succinate 50 mg, atorvastatin 40 mg. Outpatient cardiac follow up.      She reports increased stress. She stopped all prescribed medication  "I didn't see a need for the medications". She would prefer a Holistic approach. She denies CP. She does not some SOB but feels it is related to anxiety, fiance of 12 years called off the relationship. She will be homeless. Patient denies CP, SOB, palpitations, orthopnea, PND, presyncope, LOC, swelling, or claudication. She consumes a heart healthy. Patient does not exercise regularly, but remains active.      Right radial puncture site:   Past Medical History:   Diagnosis Date    GERD (gastroesophageal reflux disease)     Thyroid disease      Past Surgical History:   Procedure Laterality Date    LEFT HEART CATHETERIZATION Left 12/2/2024    Procedure: Left heart cath;  Surgeon: Dakota Pak MD;  Location: [facility] CATH LAB/EP;  Service: Cardiology;  Laterality: Left;     Social History     Socioeconomic History    " Marital status:    Tobacco Use    Smoking status: Never     Passive exposure: Never    Smokeless tobacco: Never   Substance and Sexual Activity    Alcohol use: Yes     Comment: occ    Drug use: No     Social Drivers of Health     Financial Resource Strain: Medium Risk (12/2/2024)    Overall Financial Resource Strain (CARDIA)     Difficulty of Paying Living Expenses: Somewhat hard   Food Insecurity: Food Insecurity Present (12/2/2024)    Hunger Vital Sign     Worried About Running Out of Food in the Last Year: Sometimes true     Ran Out of Food in the Last Year: Sometimes true   Transportation Needs: No Transportation Needs (12/2/2024)    TRANSPORTATION NEEDS     Transportation : No   Physical Activity: Patient Unable To Answer (12/2/2024)    Exercise Vital Sign     Days of Exercise per Week: Patient unable to answer     Minutes of Exercise per Session: Patient unable to answer   Stress: No Stress Concern Present (12/2/2024)    [place] of Occupational Health - Occupational Stress Questionnaire     Feeling of Stress : Only a little   Recent Concern: Stress - Stress Concern Present (12/1/2024)    [place] [employer] - Occupational Stress Questionnaire     Feeling of Stress : Very much   Housing Stability: Low Risk  (12/2/2024)    Housing Stability Vital Sign     Unable to Pay for Housing in the Last Year: No     Homeless in the Last Year: No     No family history on file.    Review of patient's allergies indicates:   Allergen Reactions    Gluten protein        Medication List with Changes/Refills   Current Medications    ASPIRIN 81 MG CHEW    Take 1 tablet (81 mg total) by mouth once daily.    ATORVASTATIN (LIPITOR) 40 MG TABLET    Take 1 tablet (40 mg total) by mouth once daily.    CLOPIDOGREL (PLAVIX) 75 MG TABLET    Take 1 tablet (75 mg total) by mouth once daily.    LEVOTHYROXINE (SYNTHROID) 150 MCG TABLET    Take 1 tablet (150 mcg total) by mouth before breakfast.     "METOPROLOL SUCCINATE (TOPROL-XL) 50 MG 24 HR TABLET    Take 1 tablet (50 mg total) by mouth once daily.    PANTOPRAZOLE (PROTONIX) 40 MG TABLET    Take 1 tablet (40 mg total) by mouth once daily.       Review of Systems   Constitutional: Negative for diaphoresis and fever.   HENT:  Negative for congestion and hearing loss.    Eyes:  Negative for blurred vision and pain.   Cardiovascular:  Negative for chest pain, claudication, dyspnea on exertion, leg swelling, near-syncope, palpitations and syncope.   Respiratory:  Negative for shortness of breath and sleep disturbances due to breathing.    Hematologic/Lymphatic: Negative for bleeding problem. Does not bruise/bleed easily.   Skin:  Negative for color change and poor wound healing.   Gastrointestinal:  Negative for abdominal pain and nausea.   Genitourinary:  Negative for bladder incontinence and flank pain.   Neurological:  Negative for focal weakness and light-headedness.        Objective:   /72 (BP Location: Left arm, Patient Position: Sitting)   Pulse (!) 113   Ht 5' 4" (1.626 m)   Wt 60.9 kg (134 lb 4.2 oz)   LMP 11/17/2018   SpO2 97%   BMI 23.05 kg/m²    Physical Exam  Constitutional:       Appearance: She is well-developed. She is not diaphoretic.   HENT:      Head: Normocephalic and atraumatic.   Eyes:      General: No scleral icterus.     Pupils: Pupils are equal, round, and reactive to light.   Neck:      Vascular: No JVD.   Cardiovascular:      Rate and Rhythm: Normal rate and regular rhythm.      Pulses: Intact distal pulses.           Radial pulses are 2+ on the right side and 2+ on the left side.        Dorsalis pedis pulses are 2+ on the right side and 2+ on the left side.        Posterior tibial pulses are 2+ on the right side and 2+ on the left side.      Heart sounds: S1 normal and S2 normal. No murmur heard.     No friction rub. No gallop.   Pulmonary:      Effort: Pulmonary effort is normal. No respiratory distress.      Breath " sounds: Normal breath sounds. No wheezing or rales.   Chest:      Chest wall: No tenderness.   Abdominal:      General: Bowel sounds are normal. There is no distension.      Palpations: Abdomen is soft. There is no mass.      Tenderness: There is no abdominal tenderness. There is no rebound.   Musculoskeletal:         General: No tenderness. Normal range of motion.      Cervical back: Normal range of motion and neck supple.   Skin:     General: Skin is warm and dry.      Coloration: Skin is not pale.   Neurological:      Mental Status: She is alert and oriented to person, place, and time.      Coordination: Coordination normal.      Deep Tendon Reflexes: Reflexes normal.   Psychiatric:         Behavior: Behavior normal.         Judgment: Judgment normal.             [facility] 12/2/24  Procedure performed:   Left heart catheterization   Coronary angiogram      Indication for the procedure:   NSTEMI  Findings:      Left dominant coronary circulation   Normal coronary vessels without any atherosclerotic coronary artery disease   LVEDP of 6 mm Hg.  No gradient across the aortic valve.  LV-gram not performed as a part of this study.       Recommendations:      Continue dual antiplatelet therapy for 1 year considering presentation of non ST elevation MI   Risk factor modification  Follow up in Cardiology Clinic in 1 week    Cardiac echo 12/2/24  Summary    Left Ventricle: The left ventricle is normal in size. Normal wall thickness. There is normal systolic function with a visually estimated ejection fraction of 55 - 60%. There is normal diastolic function.    Right Ventricle: Normal right ventricular cavity size. Systolic function is reduced.TAPSE is 1.43 cm. TDI S' is 8.0 cm/s.    Mitral Valve: There is prolapse of the posterior mitral leaflet. There is mild regurgitation with a posterolateral eccentriccally directed jet.    IVC/SVC: Normal venous pressure at 3 mmHg.    EKG reviewed 12/2/24- NSR     Assessment:       1. NSTEMI  (non-ST elevated myocardial infarction)    2. ACS (acute coronary syndrome)    3. Hypothyroidism due to Hashimoto's thyroiditis         Plan:         NSTEMI (non-ST elevated myocardial infarction)  -S/P [facility] 12/2/24-   Findings:      Left dominant coronary circulation   Normal coronary vessels without any atherosclerotic coronary artery disease   LVEDP of 6 mm Hg.  No gradient across the aortic valve.  LV-gram not performed as a part of this study.   - continue DAPT x 1 yr- ASA 81 mgm plavix 75 mg   -metoprolol succinate 50 mg   -atorvastatin 40 mg   -patient stopped all meds except ASA 81 mg     Hypothyroidism due to Hashimoto's thyroiditis  -continue medical therapy- Synthroid 150 mcg       Total duration of face to face visit time 30 minutes.  Total time spent counseling greater than fifty percent of total visit time.  Counseling included discussion regarding imaging findings, diagnosis, possibilities, treatment options, risks and benefits.  The patient had many questions regarding the options and long-term effects      David Pedraza, SYLVIE   Cardiology

## 2024-12-12 NOTE — ASSESSMENT & PLAN NOTE
-S/P [facility] 12/2/24-   Findings:      Left dominant coronary circulation   Normal coronary vessels without any atherosclerotic coronary artery disease   LVEDP of 6 mm Hg.  No gradient across the aortic valve.  LV-gram not performed as a part of this study.   - continue DAPT x 1 yr- ASA 81 mgm plavix 75 mg   -metoprolol succinate 50 mg   -atorvastatin 40 mg   -patient stopped all meds except ASA 81 mg

## 2024-12-19 ENCOUNTER — OFFICE VISIT (OUTPATIENT)
Dept: URGENT CARE | Facility: CLINIC | Age: 54
End: 2024-12-19
Payer: MEDICAID

## 2024-12-19 VITALS
DIASTOLIC BLOOD PRESSURE: 84 MMHG | BODY MASS INDEX: 22.88 KG/M2 | WEIGHT: 134 LBS | TEMPERATURE: 98 F | HEIGHT: 64 IN | SYSTOLIC BLOOD PRESSURE: 144 MMHG | HEART RATE: 109 BPM | RESPIRATION RATE: 20 BRPM | OXYGEN SATURATION: 96 %

## 2024-12-19 DIAGNOSIS — S13.9XXA NECK SPRAIN, INITIAL ENCOUNTER: ICD-10-CM
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
## 2024-12-19 PROCEDURE — 72040 X-RAY EXAM NECK SPINE 2-3 VW: CPT | Mod: FY,S$GLB,, | Performed by: RADIOLOGY

## 2024-12-19 PROCEDURE — 73090 X-RAY EXAM OF FOREARM: CPT | Mod: FY,RT,S$GLB, | Performed by: RADIOLOGY

## 2024-12-19 PROCEDURE — 73110 X-RAY EXAM OF WRIST: CPT | Mod: FY,RT,S$GLB, | Performed by: RADIOLOGY

## 2024-12-19 PROCEDURE — 73030 X-RAY EXAM OF SHOULDER: CPT | Mod: FY,RT,S$GLB, | Performed by: RADIOLOGY

## 2024-12-19 RX ORDER — ACETAMINOPHEN 500 MG
1000 TABLET ORAL
Status: COMPLETED | OUTPATIENT
Start: 2024-12-19 | End: 2024-12-19

## 2024-12-19 RX ORDER — DICLOFENAC SODIUM 50 MG/1
50 TABLET, DELAYED RELEASE ORAL 2 TIMES DAILY
Qty: 20 TABLET | Refills: 0 | Status: SHIPPED | OUTPATIENT
Start: 2024-12-19 | End: 2024-12-29

## 2024-12-19 RX ORDER — METHOCARBAMOL 500 MG/1
500 TABLET, FILM COATED ORAL 4 TIMES DAILY
Qty: 40 TABLET | Refills: 0 | Status: SHIPPED | OUTPATIENT
Start: 2024-12-19 | End: 2024-12-29

## 2024-12-19 RX ADMIN — Medication 1000 MG: at 12:12

## 2024-12-19 NOTE — PATIENT INSTRUCTIONS
Muscle Spasm: Robaxin every 6 hours as needed. May cause drowsiness, take first dose at night.   Pain: Diclofenac every 12 hours as needed. May alternate Tylenol every 4-6 hours as needed.  Monitor for nausea, vomiting, dizziness, change in vision, headache, inability to concentrate, new or worsening symptoms   Please drink plenty of fluids.  Please get plenty of rest.  Please return here or go to the Emergency Department for any concerns or worsening of condition.  If you were prescribed a narcotic medication, do not drive or operate heavy equipment or machinery while taking these medications.  If you were not prescribed an anti-inflammatory medication, and if you do not have any history of stomach/intestinal ulcers, or kidney disease, or are not taking a blood thinner such as Coumadin, Plavix, Pradaxa, Eloquis, or Xaralta for example, it is OK to take over the counter Ibuprofen or Advil or Motrin or Aleve as directed.  Do not take these medications on an empty stomach.  Rest, ice, compression and elevation to the affected joint or limb as needed.  Please follow up with your primary care doctor or specialist as needed.    If you  smoke, please stop smoking.

## 2024-12-19 NOTE — LETTER
December 19, 2024      [facility] [address]  [place] [address]  Phone: [phone]  Fax: [phone]       Patient: Amarilys Aguilar   YOB: 1970  Date of Visit: 12/19/2024    To Whom It May Concern:    Gisela Aguilar  was at [facility] on 12/19/2024. The patient may return to work/school on Monday, December 23rd, 2024 or sooner with no restrictions. If you have any questions or concerns, or if I can be of further assistance, please do not hesitate to contact me.    Sincerely,        David Merino PA-C

## 2024-12-19 NOTE — PROGRESS NOTES
"Subjective:      Patient ID: Amarilys Aguilar is a 54 y.o. female.    Vitals:  height is 5' 4" (1.626 m) and weight is 60.8 kg (134 lb). Her oral temperature is 97.9 °F (36.6 °C). Her blood pressure is 144/84 (abnormal) and her pulse is 109. Her respiration is 20 and oxygen saturation is 96%.     Chief Complaint: Fall    Pt is a 55 yo female presenting with R sided myalgia.  Onset of symptoms was this morning after falling down the stairs onto the R side of her body. Denies hitting her head, LOC, dizziness, nausea, vomiting. Pt reports using no OTC tx.    Fall  The accident occurred 1 to 3 hours ago. The fall occurred while walking. She landed on Hard floor. There was no blood loss. The point of impact was the right hip and right elbow. The pain is present in the neck, right elbow, right shoulder, right hip and right upper leg. The pain is at a severity of 9/10. The pain is severe. The symptoms are aggravated by pressure on injury, use of injured limb and movement. Pertinent negatives include no abdominal pain, fever, headaches, hematuria, nausea or vomiting. She has tried nothing for the symptoms.     Constitution: Negative for activity change, appetite change, chills and fever.   HENT:  Negative for ear pain, congestion, postnasal drip, sinus pain, sinus pressure and sore throat.    Neck: Negative for neck pain.   Cardiovascular:  Negative for chest pain and sob on exertion.   Eyes:  Negative for eye trauma, eye discharge, eye itching, eye redness, photophobia and blurred vision.   Respiratory:  Negative for cough, shortness of breath, wheezing and asthma.    Gastrointestinal:  Negative for abdominal pain, nausea, vomiting, constipation and diarrhea.   Genitourinary:  Negative for dysuria, frequency, urgency, urine decreased and hematuria.   Musculoskeletal:  Positive for pain and trauma. Negative for muscle ache.   Skin:  Negative for color change, rash and hives.   Allergic/Immunologic: Negative for " seasonal allergies, asthma, hives and sneezing.   Neurological:  Negative for dizziness, light-headedness, headaches and altered mental status.   Psychiatric/Behavioral:  Negative for altered mental status and confusion.       Objective:     Physical Exam   Constitutional: She is oriented to person, place, and time. She appears well-developed. She is cooperative. No distress.      Comments:Pt sitting erect on examination table. No acute respiratory distress, no use of accessory muscles, no notice of nasal flaring.        HENT:   Head: Normocephalic and atraumatic.   Nose: Nose normal.   Mouth/Throat: Oropharynx is clear and moist and mucous membranes are normal.   Eyes: Conjunctivae and lids are normal.   Neck: Trachea normal and phonation normal. Neck supple. There are signs of injury.     decreased range of motion present. pain with movement present.   Cardiovascular: Normal rate, regular rhythm, normal heart sounds and normal pulses.   Pulmonary/Chest: Effort normal and breath sounds normal. She exhibits tenderness. She exhibits no crepitus.   Lungs clear to auscultation B/L               Comments: Lungs clear to auscultation B/L      Abdominal: Normal appearance.   Musculoskeletal:         General: No deformity.      Right elbow: She exhibits normal range of motion and no swelling. No tenderness found.      Right wrist: She exhibits tenderness. She exhibits normal range of motion.      Cervical back: She exhibits tenderness and spasm.      Thoracic back: She exhibits tenderness and spasm.        Back:       Right forearm: She exhibits tenderness.        Arms:    Neurological: She is alert and oriented to person, place, and time. She has normal strength and normal reflexes. No sensory deficit.   Skin: Skin is warm, dry, intact and not diaphoretic.   Psychiatric: Her speech is normal and behavior is normal. Judgment and thought content normal.   Nursing note and vitals reviewed.  X-Ray Cervical Spine 2 or 3  Views    Result Date: 12/19/2024  EXAMINATION: XR CERVICAL SPINE 2 OR 3 VIEWS TECHNIQUE: Three views of the cervical spine were obtained, with AP, lateral, and AP odontoid projections submitted. COMPARISON: No relevant comparison examinations are currently available.  Clinical information of trauma on today's date. FINDINGS: No significant alignment abnormality, with no evidence of atlantoaxial subluxation observed.  Vertebral body heights are normally maintained, without compression deformity at any level.  No disc narrowing.  Some anterior marginal vertebral endplate spurring is incidentally noted at C4-5 and C5-6.  Vertebral endplates are well defined.  No evidence of fracture, lytic destructive process, or prevertebral soft tissue swelling.  Tiny separate ossific density projecting just inferior to the anterior arch of C1 appears smoothly corticated and is felt to be of no clinical importance.     Minor vertebral endplate spurring at C4-5 and C5-6, but no significant abnormality is identified.  No conventional radiographic evidence of recent fracture is appreciated. Electronically signed by: Jefferson Contreras MD Date:    12/19/2024 Time:    13:46    XR FOREARM RIGHT    Result Date: 12/19/2024  EXAMINATION: XR FOREARM RIGHT TECHNIQUE: Two views of the right forearm were obtained, with AP and lateral projections submitted. COMPARISON: No relevant comparison examinations are currently available.  Clinical information of trauma on today's date. FINDINGS: Visualized osseous structures appear intact, with no definite evidence of recent fracture or other significant abnormality identified.  No radiopaque soft tissue foreign body evident.     As above Electronically signed by: Jefferson Contreras MD Date:    12/19/2024 Time:    13:41    X-Ray Wrist Complete 3 views Right    Result Date: 12/19/2024  EXAMINATION: XR WRIST COMPLETE 3 VIEWS RIGHT TECHNIQUE: Three views of the right wrist were obtained, with PA, lateral, and oblique  projections submitted. COMPARISON: No relevant comparison examinations are currently available.  Clinical information of trauma on today's date. FINDINGS: Visualized osseous structures appear intact, with no definite evidence of recent fracture or other significant abnormality identified.  No radiopaque soft tissue foreign body.     As above Electronically signed by: Jefferson Contreras MD Date:    12/19/2024 Time:    13:38    XR SHOULDER TRAUMA 3 VIEW RIGHT    Result Date: 12/19/2024  EXAMINATION: XR SHOULDER TRAUMA 3 VIEW RIGHT TECHNIQUE: Three views of the right shoulder were obtained, with AP, lateral, and axillary projections submitted. COMPARISON: No relevant comparison examinations are currently available.  Clinical information of trauma on today's date. FINDINGS: Visualized osseous structures appear intact, with no definite evidence of recent fracture or other significant abnormality identified.  No glenohumeral dislocation.  Incidental note is made of some vertebral endplate spurring at the T8-9 level.     As above Electronically signed by: Jefferson Contreras MD Date:    12/19/2024 Time:    13:29        Assessment:     1. Fall, initial encounter    2. Neck sprain, initial encounter        Plan:   I have reviewed the patient chart and pertinent past imaging/labs.      Fall, initial encounter  -     acetaminophen tablet 1,000 mg  -     X-Ray Cervical Spine 2 or 3 Views; Future; Expected date: 12/19/2024  -     XR SHOULDER TRAUMA 3 VIEW RIGHT; Future; Expected date: 12/19/2024  -     XR FOREARM RIGHT; Future; Expected date: 12/19/2024  -     Cancel: XR WRIST NAVICULAR VIEWS RIGHT; Future; Expected date: 12/19/2024  -     X-Ray Wrist Complete 3 views Right; Future; Expected date: 12/19/2024  -     diclofenac (VOLTAREN) 50 MG EC tablet; Take 1 tablet (50 mg total) by mouth 2 (two) times daily. for 10 days  Dispense: 20 tablet; Refill: 0  -     methocarbamoL (ROBAXIN) 500 MG Tab; Take 1 tablet (500 mg total) by mouth 4 (four)  times daily. for 10 days  Dispense: 40 tablet; Refill: 0    Neck sprain, initial encounter          Medical Decision Making:   Urgent Care Management:  Given strict ER precaution as needed.

## 2024-12-25 PROBLEM — E03.9 HYPOTHYROIDISM: Status: ACTIVE | Noted: 2024-12-25

## 2025-01-23 ENCOUNTER — HOSPITAL ENCOUNTER (EMERGENCY)
Facility: HOSPITAL | Age: 55
Discharge: LEFT BEFORE DISCHARGE | End: 2025-01-23
Payer: MEDICAID

## 2025-01-23 PROCEDURE — 99900041 HC LEFT WITHOUT BEING SEEN- EMERGENCY

## 2025-02-25 ENCOUNTER — TELEPHONE (OUTPATIENT)
Dept: OBSTETRICS AND GYNECOLOGY | Facility: CLINIC | Age: 55
End: 2025-02-25
Payer: MEDICAID

## 2025-02-25 NOTE — TELEPHONE ENCOUNTER
LVM, please call office [phone] to r/s 1/22 appt with JULIANNA Grant that was canceled due to the weather.

## (undated) DEVICE — CATH ANG PIGTAIL 4FR INFINITY

## (undated) DEVICE — Device

## (undated) DEVICE — CATH IMPULSE 5F 100CM FR4

## (undated) DEVICE — DRAPE ANGIO BRACH 38X44IN

## (undated) DEVICE — CATH FL 3.5 5FR

## (undated) DEVICE — HEMOSTAT VASC BAND SHORT 21CM

## (undated) DEVICE — VISIPAQUE 320 200ML +PK

## (undated) DEVICE — CATH JACKY RADIAL 5FR 100CM

## (undated) DEVICE — GUIDEWIRE EMERALD .035IN 260CM

## (undated) DEVICE — COVER PROBE US 5.5X58L NON LTX

## (undated) DEVICE — KIT LEFT HEART MANIFOLD CUSTOM

## (undated) DEVICE — GLIDESHEATH SLENDER SS 5FR10CM

## (undated) DEVICE — PAD DEFIB CADENCE ADULT R2